# Patient Record
Sex: FEMALE | Race: WHITE | NOT HISPANIC OR LATINO | Employment: FULL TIME | URBAN - NONMETROPOLITAN AREA
[De-identification: names, ages, dates, MRNs, and addresses within clinical notes are randomized per-mention and may not be internally consistent; named-entity substitution may affect disease eponyms.]

---

## 2017-01-24 DIAGNOSIS — N92.4 EXCESSIVE BLEEDING IN PREMENOPAUSAL PERIOD: ICD-10-CM

## 2017-01-24 DIAGNOSIS — E03.9 HYPOTHYROIDISM: ICD-10-CM

## 2017-01-24 DIAGNOSIS — Z12.31 ENCOUNTER FOR SCREENING MAMMOGRAM FOR MALIGNANT NEOPLASM OF BREAST: ICD-10-CM

## 2017-01-24 DIAGNOSIS — L68.0 HIRSUTISM: ICD-10-CM

## 2017-02-01 ENCOUNTER — TRANSCRIBE ORDERS (OUTPATIENT)
Dept: ADMINISTRATIVE | Facility: HOSPITAL | Age: 52
End: 2017-02-01

## 2017-02-01 ENCOUNTER — APPOINTMENT (OUTPATIENT)
Dept: LAB | Facility: HOSPITAL | Age: 52
End: 2017-02-01
Payer: COMMERCIAL

## 2017-02-01 DIAGNOSIS — N92.4 EXCESSIVE BLEEDING IN PREMENOPAUSAL PERIOD: ICD-10-CM

## 2017-02-01 DIAGNOSIS — E03.9 HYPOTHYROIDISM: ICD-10-CM

## 2017-02-01 LAB
ALBUMIN SERPL BCP-MCNC: 3.8 G/DL (ref 3.5–5)
ALP SERPL-CCNC: 70 U/L (ref 46–116)
ALT SERPL W P-5'-P-CCNC: 23 U/L (ref 12–78)
ANION GAP SERPL CALCULATED.3IONS-SCNC: 9 MMOL/L (ref 4–13)
AST SERPL W P-5'-P-CCNC: 12 U/L (ref 5–45)
BILIRUB SERPL-MCNC: 1.1 MG/DL (ref 0.2–1)
BUN SERPL-MCNC: 10 MG/DL (ref 5–25)
CALCIUM SERPL-MCNC: 9.4 MG/DL (ref 8.3–10.1)
CHLORIDE SERPL-SCNC: 105 MMOL/L (ref 100–108)
CHOLEST SERPL-MCNC: 186 MG/DL (ref 50–200)
CO2 SERPL-SCNC: 27 MMOL/L (ref 21–32)
CREAT SERPL-MCNC: 0.77 MG/DL (ref 0.6–1.3)
ERYTHROCYTE [DISTWIDTH] IN BLOOD BY AUTOMATED COUNT: 13.1 % (ref 11.6–15.1)
GFR SERPL CREATININE-BSD FRML MDRD: >60 ML/MIN/1.73SQ M
GLUCOSE SERPL-MCNC: 92 MG/DL (ref 65–140)
HCT VFR BLD AUTO: 42.6 % (ref 34.8–46.1)
HDLC SERPL-MCNC: 48 MG/DL (ref 40–60)
HGB BLD-MCNC: 14 G/DL (ref 11.5–15.4)
LDLC SERPL CALC-MCNC: 102 MG/DL (ref 0–100)
MCH RBC QN AUTO: 29.1 PG (ref 26.8–34.3)
MCHC RBC AUTO-ENTMCNC: 32.9 G/DL (ref 31.4–37.4)
MCV RBC AUTO: 89 FL (ref 82–98)
PLATELET # BLD AUTO: 259 THOUSANDS/UL (ref 149–390)
PMV BLD AUTO: 9.8 FL (ref 8.9–12.7)
POTASSIUM SERPL-SCNC: 4.2 MMOL/L (ref 3.5–5.3)
PROT SERPL-MCNC: 7.3 G/DL (ref 6.4–8.2)
RBC # BLD AUTO: 4.81 MILLION/UL (ref 3.81–5.12)
SODIUM SERPL-SCNC: 141 MMOL/L (ref 136–145)
TRIGL SERPL-MCNC: 178 MG/DL
TSH SERPL DL<=0.05 MIU/L-ACNC: 2.38 UIU/ML (ref 0.36–3.74)
WBC # BLD AUTO: 7.38 THOUSAND/UL (ref 4.31–10.16)

## 2017-02-01 PROCEDURE — 80061 LIPID PANEL: CPT

## 2017-02-01 PROCEDURE — 84443 ASSAY THYROID STIM HORMONE: CPT

## 2017-02-01 PROCEDURE — 36415 COLL VENOUS BLD VENIPUNCTURE: CPT

## 2017-02-01 PROCEDURE — 85027 COMPLETE CBC AUTOMATED: CPT

## 2017-02-01 PROCEDURE — 80053 COMPREHEN METABOLIC PANEL: CPT

## 2017-02-10 ENCOUNTER — APPOINTMENT (OUTPATIENT)
Dept: LAB | Facility: CLINIC | Age: 52
End: 2017-02-10
Payer: COMMERCIAL

## 2017-02-10 ENCOUNTER — ALLSCRIPTS OFFICE VISIT (OUTPATIENT)
Dept: OTHER | Facility: OTHER | Age: 52
End: 2017-02-10

## 2017-02-10 DIAGNOSIS — L68.0 HIRSUTISM: ICD-10-CM

## 2017-02-10 PROCEDURE — 82627 DEHYDROEPIANDROSTERONE: CPT

## 2017-02-10 PROCEDURE — 82626 DEHYDROEPIANDROSTERONE: CPT

## 2017-02-10 PROCEDURE — 36415 COLL VENOUS BLD VENIPUNCTURE: CPT

## 2017-02-11 LAB — DHEA-S SERPL-MCNC: 307.4 UG/DL (ref 41.2–243.7)

## 2017-02-15 LAB — DHEA SERPL-MCNC: 385 NG/DL (ref 31–701)

## 2017-02-27 ENCOUNTER — HOSPITAL ENCOUNTER (OUTPATIENT)
Dept: RADIOLOGY | Facility: HOSPITAL | Age: 52
Discharge: HOME | End: 2017-02-27
Payer: COMMERCIAL

## 2017-02-27 DIAGNOSIS — Z12.31 ENCOUNTER FOR SCREENING MAMMOGRAM FOR MALIGNANT NEOPLASM OF BREAST: ICD-10-CM

## 2017-02-27 PROCEDURE — G0202 SCR MAMMO BI INCL CAD: HCPCS

## 2017-04-03 ENCOUNTER — ALLSCRIPTS OFFICE VISIT (OUTPATIENT)
Dept: OTHER | Facility: OTHER | Age: 52
End: 2017-04-03

## 2017-04-03 DIAGNOSIS — R63.5 ABNORMAL WEIGHT GAIN: ICD-10-CM

## 2017-04-03 DIAGNOSIS — Z86.39 PERSONAL HISTORY OF OTHER ENDOCRINE, NUTRITIONAL AND METABOLIC DISEASE: ICD-10-CM

## 2017-04-03 DIAGNOSIS — R53.83 OTHER FATIGUE: ICD-10-CM

## 2017-04-03 DIAGNOSIS — E66.9 OBESITY: ICD-10-CM

## 2017-04-03 DIAGNOSIS — E28.8 OTHER OVARIAN DYSFUNCTION: ICD-10-CM

## 2017-05-16 ENCOUNTER — TRANSCRIBE ORDERS (OUTPATIENT)
Dept: LAB | Facility: HOSPITAL | Age: 52
End: 2017-05-16

## 2017-05-16 ENCOUNTER — GENERIC CONVERSION - ENCOUNTER (OUTPATIENT)
Dept: OTHER | Facility: OTHER | Age: 52
End: 2017-05-16

## 2017-05-16 ENCOUNTER — APPOINTMENT (OUTPATIENT)
Dept: LAB | Facility: HOSPITAL | Age: 52
End: 2017-05-16
Attending: INTERNAL MEDICINE
Payer: COMMERCIAL

## 2017-05-16 DIAGNOSIS — E66.9 OBESITY: ICD-10-CM

## 2017-05-16 DIAGNOSIS — R63.5 ABNORMAL WEIGHT GAIN: Primary | ICD-10-CM

## 2017-05-16 DIAGNOSIS — R53.83 OTHER FATIGUE: ICD-10-CM

## 2017-05-16 DIAGNOSIS — E28.8 OTHER OVARIAN DYSFUNCTION: ICD-10-CM

## 2017-05-16 DIAGNOSIS — Z86.39 PERSONAL HISTORY OF OTHER ENDOCRINE, NUTRITIONAL AND METABOLIC DISEASE: ICD-10-CM

## 2017-05-16 DIAGNOSIS — R63.5 ABNORMAL WEIGHT GAIN: ICD-10-CM

## 2017-05-16 LAB
25(OH)D3 SERPL-MCNC: 8.8 NG/ML (ref 30–100)
BASOPHILS # BLD AUTO: 0.06 THOUSANDS/ΜL (ref 0–0.1)
BASOPHILS NFR BLD AUTO: 1 % (ref 0–1)
CORTIS SERPL-MCNC: <0.5 UG/ML
EOSINOPHIL # BLD AUTO: 0.01 THOUSAND/ΜL (ref 0–0.61)
EOSINOPHIL NFR BLD AUTO: 0 % (ref 0–6)
ERYTHROCYTE [DISTWIDTH] IN BLOOD BY AUTOMATED COUNT: 13.1 % (ref 11.6–15.1)
HCT VFR BLD AUTO: 43.6 % (ref 34.8–46.1)
HGB BLD-MCNC: 14.1 G/DL (ref 11.5–15.4)
LYMPHOCYTES # BLD AUTO: 2.62 THOUSANDS/ΜL (ref 0.6–4.47)
LYMPHOCYTES NFR BLD AUTO: 29 % (ref 14–44)
MCH RBC QN AUTO: 28.7 PG (ref 26.8–34.3)
MCHC RBC AUTO-ENTMCNC: 32.3 G/DL (ref 31.4–37.4)
MCV RBC AUTO: 89 FL (ref 82–98)
MONOCYTES # BLD AUTO: 0.36 THOUSAND/ΜL (ref 0.17–1.22)
MONOCYTES NFR BLD AUTO: 4 % (ref 4–12)
NEUTROPHILS # BLD AUTO: 5.99 THOUSANDS/ΜL (ref 1.85–7.62)
NEUTS SEG NFR BLD AUTO: 66 % (ref 43–75)
NRBC BLD AUTO-RTO: 0 /100 WBCS
PLATELET # BLD AUTO: 292 THOUSANDS/UL (ref 149–390)
PMV BLD AUTO: 9.8 FL (ref 8.9–12.7)
RBC # BLD AUTO: 4.92 MILLION/UL (ref 3.81–5.12)
T4 FREE SERPL-MCNC: 0.93 NG/DL (ref 0.76–1.46)
WBC # BLD AUTO: 9.07 THOUSAND/UL (ref 4.31–10.16)

## 2017-05-16 PROCEDURE — 84439 ASSAY OF FREE THYROXINE: CPT

## 2017-05-16 PROCEDURE — 36415 COLL VENOUS BLD VENIPUNCTURE: CPT

## 2017-05-16 PROCEDURE — 82533 TOTAL CORTISOL: CPT

## 2017-05-16 PROCEDURE — 85025 COMPLETE CBC W/AUTO DIFF WBC: CPT

## 2017-05-16 PROCEDURE — 84403 ASSAY OF TOTAL TESTOSTERONE: CPT

## 2017-05-16 PROCEDURE — 84402 ASSAY OF FREE TESTOSTERONE: CPT

## 2017-05-16 PROCEDURE — 82306 VITAMIN D 25 HYDROXY: CPT

## 2017-05-16 PROCEDURE — 82627 DEHYDROEPIANDROSTERONE: CPT

## 2017-05-17 ENCOUNTER — GENERIC CONVERSION - ENCOUNTER (OUTPATIENT)
Dept: OTHER | Facility: OTHER | Age: 52
End: 2017-05-17

## 2017-05-17 LAB
DHEA-S SERPL-MCNC: 320.5 UG/DL (ref 41.2–243.7)
TESTOST FREE SERPL-MCNC: 0.9 PG/ML (ref 0–4.2)
TESTOST SERPL-MCNC: 32 NG/DL (ref 3–41)

## 2017-05-21 ENCOUNTER — GENERIC CONVERSION - ENCOUNTER (OUTPATIENT)
Dept: OTHER | Facility: OTHER | Age: 52
End: 2017-05-21

## 2017-06-12 ENCOUNTER — ALLSCRIPTS OFFICE VISIT (OUTPATIENT)
Dept: OTHER | Facility: OTHER | Age: 52
End: 2017-06-12

## 2017-06-26 DIAGNOSIS — L68.0 HIRSUTISM: ICD-10-CM

## 2017-06-26 DIAGNOSIS — R22.1 LOCALIZED SWELLING, MASS OR LUMP OF NECK: ICD-10-CM

## 2017-07-25 ENCOUNTER — HOSPITAL ENCOUNTER (OUTPATIENT)
Dept: ULTRASOUND IMAGING | Facility: HOSPITAL | Age: 52
Discharge: HOME | End: 2017-07-25
Payer: COMMERCIAL

## 2017-07-25 DIAGNOSIS — R22.1 LOCALIZED SWELLING, MASS OR LUMP OF NECK: ICD-10-CM

## 2017-07-25 PROCEDURE — 76536 US EXAM OF HEAD AND NECK: CPT

## 2017-08-03 ENCOUNTER — APPOINTMENT (OUTPATIENT)
Dept: LAB | Facility: HOSPITAL | Age: 52
End: 2017-08-03
Payer: COMMERCIAL

## 2017-08-03 ENCOUNTER — TRANSCRIBE ORDERS (OUTPATIENT)
Dept: ADMINISTRATIVE | Facility: HOSPITAL | Age: 52
End: 2017-08-03

## 2017-08-03 DIAGNOSIS — Z00.8 HEALTH EXAMINATION IN POPULATION SURVEY: Primary | ICD-10-CM

## 2017-08-03 DIAGNOSIS — Z00.8 HEALTH EXAMINATION IN POPULATION SURVEY: ICD-10-CM

## 2017-08-03 DIAGNOSIS — E03.9 HYPOTHYROIDISM: ICD-10-CM

## 2017-08-03 LAB
ALBUMIN SERPL BCP-MCNC: 4.2 G/DL (ref 3.5–5)
ALP SERPL-CCNC: 70 U/L (ref 46–116)
ALT SERPL W P-5'-P-CCNC: 27 U/L (ref 12–78)
ANION GAP SERPL CALCULATED.3IONS-SCNC: 10 MMOL/L (ref 4–13)
AST SERPL W P-5'-P-CCNC: 14 U/L (ref 5–45)
BILIRUB SERPL-MCNC: 1 MG/DL (ref 0.2–1)
BUN SERPL-MCNC: 19 MG/DL (ref 5–25)
CALCIUM SERPL-MCNC: 9.6 MG/DL (ref 8.3–10.1)
CHLORIDE SERPL-SCNC: 102 MMOL/L (ref 100–108)
CHOLEST SERPL-MCNC: 228 MG/DL (ref 50–200)
CO2 SERPL-SCNC: 26 MMOL/L (ref 21–32)
CREAT SERPL-MCNC: 0.79 MG/DL (ref 0.6–1.3)
ERYTHROCYTE [DISTWIDTH] IN BLOOD BY AUTOMATED COUNT: 13.1 % (ref 11.6–15.1)
EST. AVERAGE GLUCOSE BLD GHB EST-MCNC: 123 MG/DL
GFR SERPL CREATININE-BSD FRML MDRD: 86 ML/MIN/1.73SQ M
GLUCOSE P FAST SERPL-MCNC: 106 MG/DL (ref 65–99)
HBA1C MFR BLD: 5.9 % (ref 4.2–6.3)
HCT VFR BLD AUTO: 44.5 % (ref 34.8–46.1)
HDLC SERPL-MCNC: 55 MG/DL (ref 40–60)
HGB BLD-MCNC: 14.4 G/DL (ref 11.5–15.4)
LDLC SERPL CALC-MCNC: 139 MG/DL (ref 0–100)
MCH RBC QN AUTO: 29.3 PG (ref 26.8–34.3)
MCHC RBC AUTO-ENTMCNC: 32.4 G/DL (ref 31.4–37.4)
MCV RBC AUTO: 90 FL (ref 82–98)
PLATELET # BLD AUTO: 286 THOUSANDS/UL (ref 149–390)
PMV BLD AUTO: 9.8 FL (ref 8.9–12.7)
POTASSIUM SERPL-SCNC: 4.5 MMOL/L (ref 3.5–5.3)
PROT SERPL-MCNC: 8.1 G/DL (ref 6.4–8.2)
RBC # BLD AUTO: 4.92 MILLION/UL (ref 3.81–5.12)
SODIUM SERPL-SCNC: 138 MMOL/L (ref 136–145)
TRIGL SERPL-MCNC: 168 MG/DL
TSH SERPL DL<=0.05 MIU/L-ACNC: 3.48 UIU/ML (ref 0.36–3.74)
WBC # BLD AUTO: 7.05 THOUSAND/UL (ref 4.31–10.16)

## 2017-08-03 PROCEDURE — 85027 COMPLETE CBC AUTOMATED: CPT

## 2017-08-03 PROCEDURE — 80061 LIPID PANEL: CPT

## 2017-08-03 PROCEDURE — 83036 HEMOGLOBIN GLYCOSYLATED A1C: CPT

## 2017-08-03 PROCEDURE — 36415 COLL VENOUS BLD VENIPUNCTURE: CPT

## 2017-08-03 PROCEDURE — 80053 COMPREHEN METABOLIC PANEL: CPT

## 2017-08-03 PROCEDURE — 84443 ASSAY THYROID STIM HORMONE: CPT

## 2017-08-10 DIAGNOSIS — E55.9 VITAMIN D DEFICIENCY: ICD-10-CM

## 2017-08-10 DIAGNOSIS — E03.9 HYPOTHYROIDISM: ICD-10-CM

## 2017-08-18 ENCOUNTER — OFFICE VISIT (OUTPATIENT)
Dept: URGENT CARE | Facility: CLINIC | Age: 52
End: 2017-08-18
Payer: COMMERCIAL

## 2017-08-18 PROCEDURE — 99213 OFFICE O/P EST LOW 20 MIN: CPT

## 2017-08-18 PROCEDURE — S9088 SERVICES PROVIDED IN URGENT: HCPCS

## 2017-08-23 ENCOUNTER — ALLSCRIPTS OFFICE VISIT (OUTPATIENT)
Dept: OTHER | Facility: OTHER | Age: 52
End: 2017-08-23

## 2017-11-01 DIAGNOSIS — E28.8 OTHER OVARIAN DYSFUNCTION: ICD-10-CM

## 2017-11-01 DIAGNOSIS — R47.89 OTHER SPEECH DISTURBANCES (CODE): ICD-10-CM

## 2017-11-01 DIAGNOSIS — R41.3 OTHER AMNESIA: ICD-10-CM

## 2017-11-22 ENCOUNTER — ALLSCRIPTS OFFICE VISIT (OUTPATIENT)
Dept: OTHER | Facility: OTHER | Age: 52
End: 2017-11-22

## 2017-11-23 NOTE — CONSULTS
Assessment    1  Memory difficulties (780 93) (R41 3)   2  Word finding difficulty (V40 1) (R47 89)   3  Claustrophobia (300 29) (F40 240)    Plan  Claustrophobia    · LORazepam 1 MG Oral Tablet (Ativan); 2 tablets one hour before MRI-may repeatone pill after one-hour when necessary   Rx By: Nuvia Gallardo; Dispense: 0 Days ; #:3 Tablet; Refill: 1;For: Claustrophobia; ABILIO = N; Print Rx  Memory difficulties    · EEG AWAKE AND ASLEEP; Status:Hold For - Scheduling; Requested for:22Nov2017;    Perform:[facility]; [phone]; Ordered;difficulties; Ordered By:Joaquin Crow;  Memory difficulties, Word finding difficulty    · (1) NISA SCREEN W/REFLEX TO TITER/PATTERN; Status:Active; Requestedfor:22Nov2017;    Perform:[facility]; VFA:[id number]; Ordered; For:Memory difficulties, Word finding difficulty; Ordered By:Fletcher Crow;   · (1) FOLATE; Status:Active; Requested for:22Nov2017;    Perform:[facility]; VEF:[id number]; Ordered;difficulties, Word finding difficulty; Ordered By:Joaquin Crow;   · (1) LYME ANTIBODY PROFILE W/REFLEX TO WESTERN BLOT; Status:Active; Requestedfor:22Nov2017;    Perform:[facility]; HCP:[id number]; Ordered; For:Memory difficulties, Word finding difficulty; Ordered By:Joaquin Crow;   · (1) SED RATE; Status:Active; Requested for:22Nov2017;    Perform:[facility]; AAU:[id number]; Ordered; For:Memory difficulties, Word finding difficulty; Ordered By:Joaquin Crow;   · (1) VITAMIN B12; Status:Active; Requested for:22Nov2017;    Perform:[facility]; AIS:[id number]; Ordered;difficulties, Word finding difficulty; Ordered By:Fletcher Crow;   · * MRI BRAIN WO CONTRAST; Status:Need Information - Financial Authorization; Requested for:22Nov2017;    Perform:[facility]; RJH:[id number]; Ordered;difficulties, Word finding difficulty;  Ordered By:Joaquin Crow;   · Follow-up visit in 2 months Evaluation and Treatment  Follow-up  Status: Hold For -Scheduling  Requested for: [id number]   Ordered;Memory difficulties, Word finding difficulty; Ordered By: Beulah Easley Performed:  Due: 00EKC5190    Discussion/Summary  Discussion Summary:   Yonny La did well on cognitive testing, however during conversation did occasionally have to pause to think of the correct word  Rule out left hemispheric structural lesion affecting language function, especially given the progressive nature  Rule out other secondary etiologies  Have recommended MRI head, EEG and blood work  I will see her back in follow-up on these are completed  Chief Complaint  Chief Complaint Free Text Note Form: Patient is a 47year old right handed lady referred by Dr Lamberto Magana for memory difficulty  History of Present Illness  HPI: Yonny La presents today with the above complaints  She reports that for the last 6 months she has been noticing problems with her memory and she feels that it has gotten worse the last 3 months  The main issue that she is having she describes as word-finding difficulty  She states that if somebody asked her the name of an object such as a pen if he catches are off guard it may take her a while to think of the correct name  She states that sometimes in a conversation she finds that other people were finishing what she wants to say because it takes her time to get the words out  She also finds that she sometimes misplaces things and has a hard time finding them  She reports on 1 or 2 occasions she will drive somewhere that she goes all the time and does not remember the details of getting there  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  She handles the finances at home and is handling these without air  She states her  has noticed some of the problems that she is having but other people have not  She denies any vision disturbance or localizing numbness or weakness   She reports that her grandmother had Alzheimer's disease and her mother had dementia after a long course of MS      Review of Systems  Neurological ROS:  Constitutional: recent weight gain-- and-- fatigue  HEENT:  no sinus problems, not feeling congested, no blurred vision, no dryness of the eyes, no eye pain, no hearing loss, no tinnitus, no mouth sores, no sore throat, no hoarseness, no dysphagia, no masses, no bleeding  Cardiovascular:  no chest pain or pressure, no palpitations present, the heart rate was not rapid or irregular, no swelling in the arms or legs, no poor circulation  Respiratory:  no unusual or persistant cough, no shortness of breath with or without exertion  Gastrointestinal: nausea  Genitourinary:  no incontinence, no feelings of urinary urgency, no increase in frequency, no urinary hesitancy, no dysuria, no hematuria  Musculoskeletal: arthralgias-- and-- myalgias  Integumentary  no masses, no rash, no skin lesions, no livedo reticularis  Psychiatric: depression  Endocrine hair loss or gain-- and-- loss of sexual ability or drive   Hematologic/Lymphatic:  no unusual bleeding, no tendency for easy bruising, no clotting skin or lumps  Neurological General: headache-- and-- waking up at night  Neurological Mental Status: memory problems  Neurological Cranial Nerves:  no blurry or double vision, no loss of vision, no face drooping, no facial numbness or weakness, no taste or smell loss/changes, no hearing loss or ringing, no vertigo or dizziness, no dysphagia, no slurred speech  Neurological Motor findings include:  no tremor, no twitching, no cramping(pre/post exercise), no atrophy  Neurological Coordination:  no unsteadiness, no vertigo or dizziness, no clumsiness, no problems reaching for objects  Neurological Sensory:  no numbness, no pain, no tingling, does not fall when eyes closed or taking a shower  Neurological Gait:  no difficulty walking, not falling to one side, no sensation of being pushed, has not had falls     ROS Reviewed: ROS reviewed  Active Problems  1  _ (281)   2  Abnormal weight gain (783 1) (R63 5)   3  Depression (311) (F32 9)   4  Encounter for screening mammogram for breast cancer (V76 12) (Z12 31)   5  Fatigue (780 79) (R53 83)   6  Hirsutism (704 1) (L68 0)   7  Hyperandrogenism (256 1) (E28 8)   8  Hyperglycemia (790 29) (R73 9)   9  Hyperlipidemia (272 4) (E78 5)   10  Lump in neck (784 2) (R22 1)   11  Memory difficulties (780 93) (R41 3)   12  Migraine headache (346 90) (G43 909)   13  Obesity (278 00) (E66 9)   14  Vitamin D deficiency (268 9) (E55 9)    Past Medical History  1  History of Acute diarrhea (787 91) (R19 7)   2  History of Acute upper respiratory infection (465 9) (J06 9)   3  History of Acute viral syndrome (079 99) (B34 9)   4  History of Body aches (780 96) (R52)   5  History of Excessive cerumen in left ear canal (380 4) (H61 22)   6  History of Headache, acute (784 0) (R51)   7  History of diarrhea (V12 79) (Z87 898)   8  History of dizziness (V13 89) (Z87 898)   9  History of headache (V13 89) (Z87 898)   10  History of hypothyroidism (V12 29) (Z86 39)   11  History of nausea (V12 79) (Z87 898)   12  History of sinusitis (V12 69) (Z87 09)   13  History of viral infection (V12 09) (Z86 19)   14  History of weakness (V13 89) (Z87 898)   15  History of Late Effects Of Sprain Or Strain (905 7)   16  History of Premenopausal menorrhagia (627 0) (N92 4)   17  History of Screening for malignant neoplasm of breast (V76 10) (Z12 31)  Active Problems And Past Medical History Reviewed: The active problems and past medical history were reviewed and updated today  Surgical History  1  History of Cholecystectomy   2  History of Hand Surgery  Surgical History Reviewed: The surgical history was reviewed and updated today  Family History  Mother    1  Family history of COPD, severe   2  Family history of Alzheimer's disease (V17 2) (Z82 0)   3   Family history of Multiple Sclerosis  Father 4  Family history of diabetes mellitus (V18 0) (Z83 3)  Paternal Grandmother    5  Family history of diabetes mellitus (V18 0) (Z83 3)   6  Family history of thyroid disease (V18 19) (Z83 49)  Maternal Grandfather    7  Family history of kidney disease (V18 69) (Z84 1)  Paternal Grandfather    6  Family history of diabetes mellitus (V18 0) (Z83 3)   9  Family history of Heart problem  Family History    10  Family history of Diabetes Mellitus (V18 0)   11  Family history of Heart Disease (V17 49)   12  Family history of Non-Hodgkin's Lymphoma   13  Family history of Renal Disorder  Family History Reviewed: The family history was reviewed and updated today  Social History     · Alcohol Use (History)   · Daily caffeine consumption   · Employed   · Former smoker (P28 36) (Z53 944)   ·    · Never Used Drugs   · Non smoker / tobacco user (V49 89) (Z78 9)  Social History Reviewed: The social history was reviewed and updated today  Current Meds   1  Spironolactone 50 MG Oral Tablet; 1 Tab daily; Therapy: 76ORG2889 to (Evaluate:49Tey6251); Last Rx:12Jun2017 Ordered   2  Vitamin D3 5000 UNIT Oral Capsule; take 1 capsule daily; Therapy: (Recorded:22Nov2017) to Recorded  Medication List Reviewed: The medication list was reviewed and updated today  Allergies    1  No Known Drug Allergies    Vitals  Signs   Recorded: 22Nov2017 12:48PM   Heart Rate: 78  Systolic: 272, LUE, Sitting  Diastolic: 74, LUE, Sitting  Height: 5 ft 3 5 in  Weight: 217 lb   BMI Calculated: 37 84  BSA Calculated: 2 01  Pain Scale: 0    Physical Exam   GENERAL: Cooperative in no acute distress  Well-developed and well-nourished  HEAD and NECK  Head is atraumatic normocephalic with no lesions or masses  Neck is supple with full range of motion  CARDIOVASCULAR Carotid Arteries-no carotid bruits  NEUROLOGIC: Mental Status-the patient is awake alert and oriented without aphasia or apraxia    She scored a 30/30 on MOCA Cranial Nerves: Visual fields are full to confrontation  Discs are flat  Extraocular movements are full without nystagmus  Pupils are 2-1/2 mm and reactive  Face is symmetrical to light touch  Movements of facial expression move symmetrically  Hearing is normal to finger rub bilaterally  Soft palate lifts symmetrically  Shoulder shrug is symmetrical  Tongue is midline without atrophy  Motor: No drift is noted on arm extension  Strength is full in the upper and lower extremities with normal bulk and tone  Sensory: Intact to temperature and vibratory sensation in the upper and lower extremities bilaterally  Cortical function is intact  Coordination: Finger to nose testing is performed accurately  Romberg is negative  Gait reveals a normal base with symmetrical arm swing  Tandem walk is normal  Reflexes:  2/4 and symmetrical in the biceps, triceps, brachioradialis, knee jerk and ankle jerk regions Toes are downgoing      Future Appointments    Date/Time Provider Specialty Site   12/19/2017 08:00 AM COLBY Willis  Endocrinology [facility] ENDOCRINOLOGY   12/19/2017 01:00 PM COLBY Weathers   Obstetrics/Gynecology [facility] SURGICAL [facility]       Signatures   Electronically signed by : Yasmani Walter MD; Nov 22 2017  1:25PM EST                       (Author)

## 2017-12-06 ENCOUNTER — TRANSCRIBE ORDERS (OUTPATIENT)
Dept: ADMINISTRATIVE | Facility: HOSPITAL | Age: 52
End: 2017-12-06

## 2017-12-06 DIAGNOSIS — R41.3 MEMORY LOSS: Primary | ICD-10-CM

## 2017-12-15 ENCOUNTER — APPOINTMENT (OUTPATIENT)
Dept: LAB | Facility: HOSPITAL | Age: 52
End: 2017-12-15
Payer: COMMERCIAL

## 2017-12-15 ENCOUNTER — TRANSCRIBE ORDERS (OUTPATIENT)
Dept: ADMINISTRATIVE | Facility: HOSPITAL | Age: 52
End: 2017-12-15

## 2017-12-15 ENCOUNTER — GENERIC CONVERSION - ENCOUNTER (OUTPATIENT)
Dept: OTHER | Facility: OTHER | Age: 52
End: 2017-12-15

## 2017-12-15 DIAGNOSIS — R47.89 OTHER SPEECH DISTURBANCES (CODE): ICD-10-CM

## 2017-12-15 DIAGNOSIS — R41.3 OTHER AMNESIA: ICD-10-CM

## 2017-12-15 DIAGNOSIS — E28.8 OTHER OVARIAN DYSFUNCTION: ICD-10-CM

## 2017-12-15 DIAGNOSIS — R41.3 MEMORY LOSS: Primary | ICD-10-CM

## 2017-12-15 LAB
25(OH)D3 SERPL-MCNC: 35.2 NG/ML (ref 30–100)
ANION GAP SERPL CALCULATED.3IONS-SCNC: 12 MMOL/L (ref 4–13)
BUN SERPL-MCNC: 13 MG/DL (ref 5–25)
CALCIUM SERPL-MCNC: 9.7 MG/DL (ref 8.3–10.1)
CHLORIDE SERPL-SCNC: 106 MMOL/L (ref 100–108)
CO2 SERPL-SCNC: 27 MMOL/L (ref 21–32)
CREAT SERPL-MCNC: 0.95 MG/DL (ref 0.6–1.3)
ERYTHROCYTE [SEDIMENTATION RATE] IN BLOOD: 6 MM/HOUR (ref 0–20)
FOLATE SERPL-MCNC: 16.8 NG/ML (ref 3.1–17.5)
GFR SERPL CREATININE-BSD FRML MDRD: 69 ML/MIN/1.73SQ M
GLUCOSE P FAST SERPL-MCNC: 103 MG/DL (ref 65–99)
POTASSIUM SERPL-SCNC: 4.4 MMOL/L (ref 3.5–5.3)
SODIUM SERPL-SCNC: 145 MMOL/L (ref 136–145)
VIT B12 SERPL-MCNC: 353 PG/ML (ref 100–900)

## 2017-12-15 PROCEDURE — 36415 COLL VENOUS BLD VENIPUNCTURE: CPT

## 2017-12-15 PROCEDURE — 82746 ASSAY OF FOLIC ACID SERUM: CPT

## 2017-12-15 PROCEDURE — 82306 VITAMIN D 25 HYDROXY: CPT

## 2017-12-15 PROCEDURE — 82607 VITAMIN B-12: CPT

## 2017-12-15 PROCEDURE — 86618 LYME DISEASE ANTIBODY: CPT

## 2017-12-15 PROCEDURE — 86038 ANTINUCLEAR ANTIBODIES: CPT

## 2017-12-15 PROCEDURE — 80048 BASIC METABOLIC PNL TOTAL CA: CPT

## 2017-12-15 PROCEDURE — 85652 RBC SED RATE AUTOMATED: CPT

## 2017-12-15 PROCEDURE — 82627 DEHYDROEPIANDROSTERONE: CPT

## 2017-12-16 LAB — DHEA-S SERPL-MCNC: 374.2 UG/DL (ref 41.2–243.7)

## 2017-12-18 ENCOUNTER — GENERIC CONVERSION - ENCOUNTER (OUTPATIENT)
Dept: OTHER | Facility: OTHER | Age: 52
End: 2017-12-18

## 2017-12-18 ENCOUNTER — HOSPITAL ENCOUNTER (OUTPATIENT)
Dept: MRI IMAGING | Facility: HOSPITAL | Age: 52
Discharge: HOME | End: 2017-12-21
Attending: PSYCHIATRY & NEUROLOGY
Payer: COMMERCIAL

## 2017-12-18 DIAGNOSIS — R41.3 MEMORY LOSS: ICD-10-CM

## 2017-12-18 LAB
B BURGDOR IGG SER IA-ACNC: 0.17
B BURGDOR IGM SER IA-ACNC: 0.31
RYE IGE QN: NEGATIVE

## 2017-12-18 PROCEDURE — 70551 MRI BRAIN STEM W/O DYE: CPT

## 2017-12-19 ENCOUNTER — LAB REQUISITION (OUTPATIENT)
Dept: LAB | Facility: HOSPITAL | Age: 52
End: 2017-12-19
Payer: COMMERCIAL

## 2017-12-19 ENCOUNTER — ALLSCRIPTS OFFICE VISIT (OUTPATIENT)
Dept: OTHER | Facility: OTHER | Age: 52
End: 2017-12-19

## 2017-12-19 DIAGNOSIS — Z12.31 ENCOUNTER FOR SCREENING MAMMOGRAM FOR MALIGNANT NEOPLASM OF BREAST: ICD-10-CM

## 2017-12-19 DIAGNOSIS — Z01.419 ENCOUNTER FOR GYNECOLOGICAL EXAMINATION WITHOUT ABNORMAL FINDING: ICD-10-CM

## 2017-12-19 DIAGNOSIS — Z11.51 ENCOUNTER FOR SCREENING FOR HUMAN PAPILLOMAVIRUS (HPV): ICD-10-CM

## 2017-12-19 PROCEDURE — 87624 HPV HI-RISK TYP POOLED RSLT: CPT | Performed by: OBSTETRICS & GYNECOLOGY

## 2017-12-19 PROCEDURE — G0145 SCR C/V CYTO,THINLAYER,RESCR: HCPCS | Performed by: OBSTETRICS & GYNECOLOGY

## 2017-12-20 NOTE — PROGRESS NOTES
Assessment  1  Encounter for gynecological examination without abnormal finding (V72 31) (Z01 419)    Plan  Encounter for screening mammogram for malignant neoplasm of breast    · * MAMMO SCREENING BILATERAL W CAD; Status:Active - Retrospective By ProtocolAuthorization; Requested for:[id number];    Perform:[facility]; Due:33Xca7344; Last Updated By:Pipe Holt; 12/19/2017 1:18:00 PM;Ordered;For:Encounter for screening mammogram for malignant neoplasm of breast; Ordered By:Saira Valenzuela; Active Problems  1  Abnormal weight gain (783 1) (R63 5)   2  Arthritis (716 90) (M19 90)   3  Claustrophobia (300 29) (F40 240)   4  Depression (311) (F32 9)   5  Encounter for screening mammogram for breast cancer (V76 12) (Z12 31)   6  Encounter for screening mammogram for malignant neoplasm of breast (V76 12) (Z12 31)   7  Fatigue (780 79) (R53 83)   8  GERD (gastroesophageal reflux disease) (530 81) (K21 9)   9  Hirsutism (704 1) (L68 0)   10  Hyperandrogenism (256 1) (E28 8)   11  Hyperglycemia (790 29) (R73 9)   12  Hyperlipidemia (272 4) (E78 5)   13  Lump in neck (784 2) (R22 1)   14  Memory difficulties (780 93) (R41 3)   15  Migraine headache (346 90) (G43 909)   16  Obesity (278 00) (E66 9)   17  Prediabetes (790 29) (R73 03)   18  Vitamin D deficiency (268 9) (E55 9)   19   Word finding difficulty (V40 1) (R47 89)    Past Medical History   · History of Acute diarrhea (787 91) (R19 7)   · History of Acute upper respiratory infection (465 9) (J06 9)   · History of Acute viral syndrome (079 99) (B34 9)   · History of Body aches (780 96) (R52)   · History of Excessive cerumen in left ear canal (380 4) (H61 22)   · History of Headache, acute (784 0) (R51)   · History of diarrhea (V12 79) (W97 337)   · History of dizziness (V13 89) (T11 021)   · History of headache (V13 89) (Z03 175)   · History of hypothyroidism (V12 29) (Z86 39)   · History of nausea (V12 79) (T95 125)   · History of sinusitis (V12 69) (Z87 09)   · History of viral infection (V12 09) (Z86 19)   · History of weakness (V13 89) (C59 875)   · History of Late Effects Of Sprain Or Strain (905 7)   · History of Premenopausal menorrhagia (627 0) (N92 4)   · History of Screening for malignant neoplasm of breast (V76 10) (Z12 31)    Surgical History   · History of Cholecystectomy   · History of Hand Surgery    Family History  Mother    · Family history of COPD, severe   · Family history of Alzheimer's disease (V17 2) (Z82 0)  Father    · Family history of diabetes mellitus (V18 0) (Z83 3)  Sister    · Family history of diabetes mellitus (V18 0) (Z83 3)   · Family history of thyroid disease (V18 19) (Z80 46)  Paternal Grandmother    · Family history of diabetes mellitus (V18 0) (Z83 3)   · Family history of thyroid disease (V18 19) (Z83 49)  Maternal Grandfather    · Family history of kidney disease (V22 75) (Z80 3)  Paternal Grandfather    · Family history of diabetes mellitus (V18 0) (Z83 3)  Family History    · Family history of Non-Hodgkin's Lymphoma   · Family history of Renal Disorder    Social History   · Alcohol Use (History)   · Occasional wine a couple days a week   · Daily caffeine consumption   · Employed   · [employer]   · Former smoker (R50 50) (F10 496)   · 1/2 PACK DAILY   ·    · Never Used Drugs   · Non smoker / tobacco user (V49 89) (Z78 9)    Current Meds   1  Spironolactone 50 MG Oral Tablet; 1 Tab twice  daily; Therapy: 56VZR6738 to (Evaluate:17Jun2018); Last Rx:09Ktt8090 Ordered   2  Vitamin D3 5000 UNIT Oral Capsule; take 1 capsule daily; Therapy: (Recorded:22Nov2017) to Recorded    Allergies  1   No Known Drug Allergies    Vitals   Recorded: 26RWT5874 23:44KY   Systolic 176   Diastolic 62   Height 5 ft 4 in   Weight 215 lb    BMI Calculated 36 9   BSA Calculated 2 02   LMP [id number]     Future Appointments    Date/Time Provider Specialty Site   02/02/2018 03:40 PM Beulah Easley MD Neurology [facility]   06/18/2018 08:30 AM Talyor Moura [facility] Endocrinology [facility] ENDOCRINOLOGY     Signatures   Electronically signed by : COLBY Cruz ; Dec 19 2017  4:22PM EST

## 2017-12-20 NOTE — PROGRESS NOTES
Assessment  1  Encounter for gynecological examination without abnormal finding (V72 31) (Z01 419)    Plan  Encounter for screening mammogram for malignant neoplasm of breast    · * MAMMO SCREENING BILATERAL W CAD; Status:Hold For - Scheduling,RetrospectiveBy Protocol Authorization; Requested for:[id number];    Perform:[facility]; Due:93Rox7514; Last Updated By:Vanessa Shields; 12/19/2017 12:59:53 PM;Ordered;For:Encounter for screening mammogram for malignant neoplasm of breast; Ordered By:Saira Valenzuela;    Discussion/Summary  healthy adult female the risks and benefits of cervical cancer screening were discussed HPV and Pap Co-testing Done Today Breast cancer screening: the risks and benefits of breast cancer screening were discussed and mammogram has been ordered  Colorectal cancer screening: the risks and benefits of colorectal cancer screening were discussed and colorectal cancer screening is current  The patient has the current Goals: Maintain a healthy lifestyle  The patent has the current Barriers: None  Patient is able to Self-Care  Chief Complaint  Patient presents today as a new patient for a yearly GYN Exam       History of Present Illness  HPI: patient is here for her gyn exam she is a new patient her menses are 8 times in 1 year in the past had endometrial ablation for menorrhagia patient denies any gyn problem   GYN HM, Adult Female [facility]: The patient is being seen for a gynecology evaluation  Social History: Household members include spouse  She is   The patient is a former cigarette smoker and quit smoking 5 yrs ago  She reports occasional alcohol use  She has never used illicit drugs  General Health: The patient's health since the last visit is described as good  Lifestyle:  She has weight concerns  -- She exercises regularly  She exercises 3 or more times per week   Exercise includes walking -- She does not use tobacco  The patient is a former cigarette smoker-- and-- quit smoking cigarettes: 5 yrs ago  -- She consumes alcohol  She reports occasional alcohol use  -- She denies drug use  Reproductive health: the patient is perimenopausal--   she reports menstrual problems  Menstrual history: The cycles are irregular-- and-- have been less frequent  Menstrual Problems: dysmenorrhea  -- she uses no contraception  -- she is sexually active  -- pregnancy history: G 3P 2,-- 1(miscarriages: 1 )  Screening:      Review of Systems  periods are irregular, but-- no pelvic pain,-- no menorrhagia,-- no dysuria-- and-- no urinary loss of control  Constitutional: No fever, no chills, feels well, no tiredness, no recent weight gain or loss  Cardiovascular: no complaints of slow or fast heart rate, no chest pain, no palpitations, no leg claudication or lower extremity edema  Breasts: no complaints of breast pain, breast lump or nipple discharge  Gastrointestinal: no complaints of abdominal pain, no constipation, no nausea or diarrhea, no vomiting, no bloody stools  Genitourinary: as noted in HPI  OB History  Pregnancy History (Brief):  Prior pregnancies: : 3  Para: 3 (full-term)-- and-- 2 (living)  Delivery type: 2 vaginal  Additional pregnancy history details: 1 miscarriage(s)   x1 Son  due to Brain Cancer      Active Problems  1  Abnormal weight gain (783 1) (R63 5)   2  Arthritis (716 90) (M19 90)   3  Claustrophobia (300 29) (F40 240)   4  Depression (311) (F32 9)   5  Encounter for screening mammogram for breast cancer (V76 12) (Z12 31)   6  Encounter for screening mammogram for malignant neoplasm of breast (V76 12) (Z12 31)   7  Fatigue (780 79) (R53 83)   8  GERD (gastroesophageal reflux disease) (530 81) (K21 9)   9  Hirsutism (704 1) (L68 0)   10  Hyperandrogenism (256 1) (E28 8)   11  Hyperglycemia (790 29) (R73 9)   12  Hyperlipidemia (272 4) (E78 5)   13  Lump in neck (784 2) (R22 1)   14  Memory difficulties (780 93) (R41 3)   15   Migraine headache (346 90) (G43 909)   16  Obesity (278 00) (E66 9)   17  Prediabetes (790 29) (R73 03)   18  Vitamin D deficiency (268 9) (E55 9)   19   Word finding difficulty (V40 1) (R47 89)    Past Medical History   · History of Acute diarrhea (787 91) (R19 7)   · History of Acute upper respiratory infection (465 9) (J06 9)   · History of Acute viral syndrome (079 99) (B34 9)   · History of Body aches (780 96) (R52)   · History of Excessive cerumen in left ear canal (380 4) (H61 22)   · History of Headache, acute (784 0) (R51)   · History of diarrhea (V12 79) (Q24 600)   · History of dizziness (V13 89) (C90 810)   · History of headache (V13 89) (D58 428)   · History of hypothyroidism (V12 29) (Z86 39)   · History of nausea (V12 79) (O95 419)   · History of sinusitis (V12 69) (Z87 09)   · History of viral infection (V12 09) (Z86 19)   · History of weakness (V13 89) (Z87 898)   · History of Late Effects Of Sprain Or Strain (905 7)   · History of Premenopausal menorrhagia (627 0) (N92 4)   · History of Screening for malignant neoplasm of breast (V76 10) (Z12 31)    Surgical History   · History of Cholecystectomy   · History of Hand Surgery    Family History  Mother    · Family history of COPD, severe   · Family history of Alzheimer's disease (V17 2) (Z82 0)  Father    · Family history of diabetes mellitus (V18 0) (Z83 3)  Sister    · Family history of diabetes mellitus (V18 0) (Z83 3)   · Family history of thyroid disease (V18 19) (Z80 46)  Paternal Grandmother    · Family history of diabetes mellitus (V18 0) (Z83 3)   · Family history of thyroid disease (V18 19) (Z83 49)  Maternal Grandfather    · Family history of kidney disease (V22 75) (Z80 3)  Paternal Grandfather    · Family history of diabetes mellitus (V18 0) (Z83 3)  Family History    · Family history of Non-Hodgkin's Lymphoma   · Family history of Renal Disorder    Social History   · Alcohol Use (History)   · Occasional wine a couple days a week   · Daily caffeine consumption   · 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   · Former smoker (L86 75) (M15 429)   · 1/2 PACK DAILY   ·    · Never Used Drugs   · Non smoker / tobacco user (V49 89) (Z78 9)    Current Meds   1  Spironolactone 50 MG Oral Tablet; 1 Tab twice  daily; Therapy: 98SMX5385 to (Evaluate:17Jun2018); Last Rx:25Rxb7844 Ordered   2  Vitamin D3 5000 UNIT Oral Capsule; take 1 capsule daily; Therapy: (Recorded:22Nov2017) to Recorded    Allergies  1  No Known Drug Allergies    Vitals   Recorded: 73KPR7136 39:89PG   Systolic 628   Diastolic 62   Height 5 ft 4 in   Weight 215 lb    BMI Calculated 36 9   BSA Calculated 2 02   LMP [id number]     Physical Exam   Constitutional  General appearance: No acute distress, well appearing and well nourished  Pulmonary  Auscultation of lungs: Clear to auscultation  Cardiovascular  Auscultation of heart: Normal rate and rhythm, normal S1 and S2, no murmurs  Genitourinary  External genitalia: Normal and no lesions appreciated  Vagina: Normal, no lesions or dryness appreciated  Urethra: Normal    Urethral meatus: Normal    Bladder: Normal, soft, non-tender and no prolapse or masses appreciated  Cervix: Normal, no palpable masses  Examination of the cervix revealed normal findings  A Pap smear was performed  Uterus: Normal, non-tender, not enlarged, and no palpable masses  Adnexa/parametria: Normal, non-tender and no fullness or masses appreciated  Anus, perineum, and rectum: Normal sphincter tone, no masses, and no prolapse  Chest  Breasts: Normal and no dimpling or skin changes noted  normal appearance  Examination of the nipples revealed normal appearance  Right Breast:  No masses palpated  Left Breast: No masses palpated  Abdomen  Abdomen: Normal, non-tender, and no organomegaly noted         Future Appointments    Date/Time Provider Specialty Site   02/02/2018 03:40 PM Kvng Klein MD Neurology [facility]    06/18/2018 08:30 AM Shay Kelley, [facility] Endocrinology [facility] ENDOCRINOLOGY     Signatures   Electronically signed by : COLBY Ford ; Dec 19 2017  1:40PM EST                       (Author)

## 2017-12-20 NOTE — PROGRESS NOTES
Assessment  1  Prediabetes (790 29) (R73 03)   2  Vitamin D deficiency (268 9) (E55 9)   3  Obesity (278 00) (E66 9)   4  Hirsutism (704 1) (L68 0)    Plan  Hirsutism, Hyperandrogenism    · From  Spironolactone 50 MG Oral Tablet 1 Tab daily To Spironolactone 50 MGOral Tablet 1 Tab twice  daily   Rx By: Pricilla Sandifer; Dispense: 90 Days ; #:180 Tablet; Refill: 1;For: Hirsutism, Hyperandrogenism; ABILIO = N; Print Rx  Prediabetes    · (1) BASIC METABOLIC PROFILE; Status:Active; Requested UBY:[id number]; Perform:[facility]; DCS:[id number];ZEAKS; For:Prediabetes; Ordered By:Tomy Davies;   · (1) HEMOGLOBIN A1C; Status:Active; Requested PSL:[id number]; Perform:[facility]; HWN:85KMQ5558;HOMERZIMD; For:Prediabetes; Ordered By:Tomy Davies;   · Follow-up visit in 6 months Evaluation and Treatment  Follow-up  Status: Complete Done: 09NXH2106   Ordered; For: Prediabetes; Ordered By: Pricilla Sandifer Performed:  Due: 43ZGH5905; Last Updated By: Media Ode; 12/19/2017 8:23:30 AM    Discussion/Summary   1  Obesity/ pre diabetes-focus on dietary and lifestyle modifications and weight loss, discussed options of starting saxenda-   Patient has no history of pancreatitis, no personal or family history of medullary thyroid cancer  She is going to think about that and let us know if she is interested in starting  2  Vitamin-D deficiency - replete, continue supplementations 3  Hirsutism - increase spironolactone to 50 mg twice daily, will check electrolytes after she has been on the higher dose for about a month Counseling Documentation With Imm: The patient was counseled regarding diagnostic results,-- instructions for management,-- risk factor reductions,-- impressions,-- risks and benefits of treatment options,-- importance of compliance with treatment  Patient's Capacity to Self-Care: Patient is able to Self-Care     Medication SE Review and Pt Understands Tx: Possible side effects of new medications were reviewed with the patient/guardian today  The treatment plan was reviewed with the patient/guardian  The patient/guardian understands and agrees with the treatment plan      Chief Complaint  Chief Complaint Free Text Note Form: Follow up  History of Present Illness  HPI: 46 y/o woman here To follow-up on hirsutism, vitamin-D deficiency and obesity has had is some has mildly improved since starting spironolactone, trying to lose weight and has lost about 3 lb since her last was atc/o fatigue ,trying to eat better , not very physically active    history of thyroid dysfunction - was on Synthroid 20 years ago for 3 months - recent TSH was normal for vitamin-D deficiency she is on supplementations      Review of Systems  ROS Reviewed:   ROS reviewed  Endo Adult ROS Female Established v2 Update - [facility]:  Constitutional/General: recent weight gain,-- no recent weight loss,-- poor energy/fatigue,-- no increased energy level,-- insomnia/sleep problems,-- no fever-- and-- feeling weak  Breasts: no nipple discharge  Heart: no high blood pressure,-- no chest pain/tightness,-- no rapid/racing heart rate-- and-- no palpitations  Genitourinary - Urinary: no frequent urination,-- no excess urination-- and-- no urinating during the night  Eyes: no blurred vision,-- no double vision,-- no bulging eyes,-- no gritty/scratchy eyes-- and-- no excessive tearing  Mouth / Throat: no hoarseness-- and-- no difficulty swallowing  Neck: lumps,-- no swollen glands,-- no neck pain,-- no neck stiffness-- and-- no enlarged thyroid  Respiratory: no wheezing,-- no asthma-- and-- no persistent cough  Musculoskeletal: muscle aches/pain,-- no joint aches/pain-- and-- muscle weakness  Skin & Hair: no dry skin,-- no acne,-- the hair texture was oily,-- hair loss-- and-- excessive hair growth  Gastrointestinal: no constipation,-- no diarrhea,-- no waking at night to drink-- and-- no stomach ache    Neurological: no blackouts,-- no weakness-- and-- no tremors  Reproductive:  frequency of period is not applicable  -- duration of period is not applicable  -- Date of last menstruation is not applicable  -- regular periods are not applicable  -- discomfort with periods is not applicable  -- excessive bleeding during period is not applicable  -- mood swings are not applicable  Endocrine: no feeling hot frequently,-- no feeling cold frequently,-- no shifts between feeling hot and cold,-- no cold hands or feet,-- no excessive sweating,-- no thyroid problems,-- no blood sugar problems,-- no excessive thirst,-- no excessive hunger,-- no change in shoe size,-- no nausea or vomiting-- and-- no shaky hands  Active Problems  1  _ (281)   2  Abnormal weight gain (783 1) (R63 5)   3  Arthritis (716 90) (M19 90)   4  Claustrophobia (300 29) (F40 240)   5  Depression (311) (F32 9)   6  Encounter for screening mammogram for breast cancer (V76 12) (Z12 31)   7  Fatigue (780 79) (R53 83)   8  GERD (gastroesophageal reflux disease) (530 81) (K21 9)   9  Hirsutism (704 1) (L68 0)   10  Hyperandrogenism (256 1) (E28 8)   11  Hyperglycemia (790 29) (R73 9)   12  Hyperlipidemia (272 4) (E78 5)   13  Lump in neck (784 2) (R22 1)   14  Memory difficulties (780 93) (R41 3)   15  Migraine headache (346 90) (G43 909)   16  Obesity (278 00) (E66 9)   17  Vitamin D deficiency (268 9) (E55 9)   18  Word finding difficulty (V40 1) (R47 89)    Past Medical History  1  History of Acute diarrhea (787 91) (R19 7)   2  History of Acute upper respiratory infection (465 9) (J06 9)   3  History of Acute viral syndrome (079 99) (B34 9)   4  History of Body aches (780 96) (R52)   5  History of Excessive cerumen in left ear canal (380 4) (H61 22)   6  History of Headache, acute (784 0) (R51)   7  History of diarrhea (V12 79) (Z87 898)   8  History of dizziness (V13 89) (Z87 898)   9  History of headache (V13 89) (Z87 898)   10  History of hypothyroidism (V12 29) (Z86 39)   11   History of nausea (V12 79) (Z87 898)   12  History of sinusitis (V12 69) (Z87 09)   13  History of viral infection (V12 09) (Z86 19)   14  History of weakness (V13 89) (Z87 898)   15  History of Late Effects Of Sprain Or Strain (905 7)   16  History of Premenopausal menorrhagia (627 0) (N92 4)   17  History of Screening for malignant neoplasm of breast (V76 10) (Z12 31)  Active Problems And Past Medical History Reviewed: The active problems and past medical history were reviewed and updated today  Surgical History  1  History of Cholecystectomy   2  History of Hand Surgery  Surgical History Reviewed: The surgical history was reviewed and updated today  Family History  Mother    1  Family history of COPD, severe   2  Family history of Alzheimer's disease (V17 2) (Z82 0)   3  Family history of Multiple Sclerosis  Father    4  Family history of diabetes mellitus (V18 0) (Z83 3)  Paternal Grandmother    11  Family history of diabetes mellitus (V18 0) (Z83 3)   6  Family history of thyroid disease (V18 19) (Z83 49)  Maternal Grandfather    7  Family history of kidney disease (V18 69) (Z84 1)  Paternal Grandfather    6  Family history of diabetes mellitus (V18 0) (Z83 3)   9  Family history of Heart problem  Family History    10  Family history of Diabetes Mellitus (V18 0)   11  Family history of Heart Disease (V17 49)   12  Family history of Non-Hodgkin's Lymphoma   13  Family history of Renal Disorder  Family History Reviewed: The family history was reviewed and updated today  Social History   · Alcohol Use (History)   · Daily caffeine consumption   · Employed   · Former smoker (O72 82) (C22 442)   ·    · Never Used Drugs   · Non smoker / tobacco user (V49 89) (Z78 9)  Social History Reviewed: The social history was reviewed and updated today  Current Meds   1  Spironolactone 50 MG Oral Tablet; 1 Tab daily; Therapy: 84UDS4806 to (Evaluate:76Unt2395); Last Rx:12Jun2017 Ordered   2   Vitamin D3 5000 UNIT Oral Capsule; take 1 capsule daily; Therapy: (Recorded:05Hyf2344) to Recorded  Medication List Reviewed: The medication list was reviewed and updated today  Allergies  1  No Known Drug Allergies    Vitals  Vital Signs    Recorded: 25VIQ3388 08:02AM   Heart Rate 80   Systolic 445   Diastolic 84   Height 5 ft 3 5 in   Weight 214 lb 7 04 oz   BMI Calculated 37 39   BSA Calculated 2     Physical Exam   Constitutional  General appearance: No acute distress, well appearing and well nourished  Eyes  Conjunctiva and lids: No swelling, erythema, or discharge  Pupils: Equal, round and reactive to light  The sclera are anicteric  Extraocular movements are intact  Ears, Nose, Mouth, and Throat  External inspection of ears, nose and lips: Normal    Exam of Head: The head is atraumatic and normocephalic  Neck: The neck is supple  The thyroid is normal in size with no palpable nodules  Pulmonary  Auscultation of lungs: Clear to auscultation bilaterally with normal chest expansion  Cardiovascular  Auscultation of heart: Normal rate and rhythm with no murmurs, gallops or rubs  Examination of extremities for edema and/or varicosities: Normal    Abdomen  Abdomen: Abdomen is soft, non-tender with normal bowel sounds  Lymphatic  Palpation of lymph nodes: No supraclavicular or suboccipital lymphadenopathy  Musculoskeletal  Gait and station: Normal    Inspection/palpation of joints, bones, and muscles: Muscle bulk and tone is normal    Skin  Skin and subcutaneous tissue: Normal skin temperature and color  Neurologic  Motor Strength: Strength is 5/5 bilaterally     Psychiatric  Orientation to person, place and time: Normal    Mood and affect: Affect and attention span are normal        Results/Data  (1) BASIC METABOLIC PROFILE [id number] 07:08AM Jayla Avilez Order Number: [id number]     Test Name Result Flag Reference   SODIUM 145 mmol/L  136-145   POTASSIUM 4 4 mmol/L  3 5-5 3 CHLORIDE 106 mmol/L  100-108   CARBON DIOXIDE 27 mmol/L  21-32   ANION GAP (CALC) 12 mmol/L  4-13   BLOOD UREA NITROGEN 13 mg/dL  5-25   CREATININE 0 95 mg/dL  0 60-1 30   Standardized to IDMS reference method   CALCIUM 9 7 mg/dL  8 3-10 1   eGFR 69 ml/min/1 73sq m     [facility] Disease Education Program recommendations are as follows: GFR calculation is accurate only with a steady state creatinine Chronic Kidney disease less than 60 ml/min/1 73 sq  meters Kidney failure less than 15 ml/min/1 73 sq  meters  GLUCOSE FASTING 103 mg/dL H 65-99   Specimen collection should occur prior to Sulfasalazine administration due to the potential for falsely depressed results  Specimen collection should occur prior to Sulfapyridine administration due to the potential for falsely elevated results  (1) DHEA-S 25Pph1266 07:08AM [facility] Order Number: [id number]     Test Name Result Flag Reference   DHEA-SULFATE 374 2 ug/dL H 41 2 - 243 7   Performed at:  [address] [place], [place] [id number] : Estrellita Mckinley MD, Phone:  [phone]     (1) VITAMIN D 25-HYDROXY [id number] 07:08AM Jaye [facility] Order Number: [id number]     Test Name Result Flag Reference   VIT D 25-HYDROX 35 2 ng/mL  30 0-100 0   This assay is a certified procedure of the CDC Vitamin D Standardization Certification Program (VDSCP)   Deficiency <20ng/ml  Insufficiency 20-30ng/ml  Sufficient  ng/ml   *Patients undergoing fluorescein dye angiography may retain small amounts of fluorescein in the body for 48-72 hours post procedure  Samples containing fluorescein can produce falsely elevated Vitamin D values  If the patient had this procedure, a specimen should be resubmitted post fluorescein clearance  Health Management  Health Maintenance   Digital Bilateral Screening Mammogram With CAD; every 1 year; Next Due: 38Xoh0783;  Overdue    Future Appointments    Date/Time Provider Specialty Site   12/19/2017 01:00 PM COLBY Luz   Obstetrics/Gynecology [facility] OB[facility]GYN [facility] AND SURG[facility]   02/02/2018 03:40 PM Christie Hu MD Neurology NEUROLOGY [facility] OF [facility]    06/18/2018 08:30 AM Stan Herrera, [facility] Endocrinology [facility] ENDOCRINOLOGY     Signatures   Electronically signed by : COLBY Harman ; Dec 19 2017  9:20AM EST                       (Author)

## 2017-12-21 ENCOUNTER — HOSPITAL ENCOUNTER (OUTPATIENT)
Dept: NEUROLOGY | Facility: HOSPITAL | Age: 52
Discharge: HOME | End: 2017-12-24
Attending: PSYCHIATRY & NEUROLOGY
Payer: COMMERCIAL

## 2017-12-21 DIAGNOSIS — R41.3 MEMORY LOSS: ICD-10-CM

## 2017-12-21 LAB — HPV RRNA GENITAL QL NAA+PROBE: NORMAL

## 2017-12-21 PROCEDURE — 95819 EEG AWAKE AND ASLEEP: CPT

## 2017-12-27 LAB
LAB AP GYN PRIMARY INTERPRETATION: NORMAL
LAB AP LMP: NORMAL
Lab: NORMAL
PATH INTERP SPEC-IMP: NORMAL

## 2018-01-04 ENCOUNTER — ALLSCRIPTS OFFICE VISIT (OUTPATIENT)
Dept: OTHER | Facility: OTHER | Age: 53
End: 2018-01-04

## 2018-01-10 NOTE — RESULT NOTES
Discussion/Summary   ok, appropriate response      Verified Results  (1) CORTISOL SUPPRESSION [id number] 07:09AM Robbie Evans     Test Name Result Flag Reference   CORTISOL, RANDOM <0 5 ug/mL LL

## 2018-01-13 VITALS
HEART RATE: 74 BPM | HEIGHT: 65 IN | WEIGHT: 211.38 LBS | DIASTOLIC BLOOD PRESSURE: 78 MMHG | SYSTOLIC BLOOD PRESSURE: 108 MMHG | BODY MASS INDEX: 35.22 KG/M2

## 2018-01-13 VITALS
HEIGHT: 65 IN | HEART RATE: 72 BPM | SYSTOLIC BLOOD PRESSURE: 110 MMHG | WEIGHT: 209.13 LBS | BODY MASS INDEX: 34.84 KG/M2 | DIASTOLIC BLOOD PRESSURE: 80 MMHG

## 2018-01-13 VITALS
BODY MASS INDEX: 37.05 KG/M2 | SYSTOLIC BLOOD PRESSURE: 120 MMHG | DIASTOLIC BLOOD PRESSURE: 74 MMHG | HEIGHT: 64 IN | WEIGHT: 217 LBS | HEART RATE: 78 BPM

## 2018-01-13 VITALS
DIASTOLIC BLOOD PRESSURE: 76 MMHG | SYSTOLIC BLOOD PRESSURE: 118 MMHG | RESPIRATION RATE: 18 BRPM | WEIGHT: 208.25 LBS | HEART RATE: 70 BPM | BODY MASS INDEX: 34.65 KG/M2

## 2018-01-14 VITALS
HEIGHT: 65 IN | HEART RATE: 73 BPM | DIASTOLIC BLOOD PRESSURE: 82 MMHG | WEIGHT: 217.38 LBS | BODY MASS INDEX: 36.22 KG/M2 | OXYGEN SATURATION: 97 % | SYSTOLIC BLOOD PRESSURE: 118 MMHG

## 2018-01-15 NOTE — RESULT NOTES
Discussion/Summary   DHEA-S slightly high - will  monitor for now      Verified Results  (1) TESTOSTERONE, FREE (DIRECT) AND TOTAL [id number] 07:09AM Dee Jewel Order Number: [id number]     Test Name Result Flag Reference   FREE TESTOSTERONE, DIRECT 0 9 pg/mL  0 0 - 4 2   TESTOSTERONE (TOTAL) 32 ng/dL  3 - 41   Performed at:  [address] [place] [address]  [id number]  : Gurpreet Salinas MD, Phone:  [phone]     (1) DHEA-S [id number] 07:09AM Dee Jewel Order Number: [id number]     Test Name Result Flag Reference   DHEA-SULFATE 320 5 ug/dL H 41 2 - 243 7   Performed at:  [facility] [address]  [id number]  : Gurpreet Salinas MD, Phone:  [phone]       Plan  Vitamin D deficiency    · Vitamin D (Ergocalciferol) 55108 UNIT Oral Capsule; TAKE 1 CAPSULE  WEEKLY   then start OTC Vitamin D3 5,000 iu daily

## 2018-01-17 NOTE — RESULT NOTES
Discussion/Summary   cbc ok, very low vitamin d - start drisdol 50,000 iu once a week for 6 weeks and then start vitamin d3 5000 iu daily ,      Verified Results  (1) T4, FREE [id number] 07:09AM Rosendo Garciaee Order Number: [id number]     Test Name Result Flag Reference   T4,FREE 0 93 ng/dL  0 76-1 46     (1) CBC/PLT/DIFF [id number] 07:09AM [facility] Order Number: [id number]     Test Name Result Flag Reference   WBC COUNT 9 07 Thousand/uL  4 31-10 16   RBC COUNT 4 92 Million/uL  3 81-5 12   HEMOGLOBIN 14 1 g/dL  11 5-15 4   HEMATOCRIT 43 6 %  34 8-46  1   MCV 89 fL  82-98   MCH 28 7 pg  26 8-34 3   MCHC 32 3 g/dL  31 4-37 4   RDW 13 1 %  11 6-15 1   MPV 9 8 fL  8 9-12 7   PLATELET COUNT 604 Thousands/uL  149-390   nRBC AUTOMATED 0 /100 WBCs     NEUTROPHILS RELATIVE PERCENT 66 %  43-75   LYMPHOCYTES RELATIVE PERCENT 29 %  14-44   MONOCYTES RELATIVE PERCENT 4 %  4-12   EOSINOPHILS RELATIVE PERCENT 0 %  0-6   BASOPHILS RELATIVE PERCENT 1 %  0-1   NEUTROPHILS ABSOLUTE COUNT 5 99 Thousands/? ??L  1 85-7 62   LYMPHOCYTES ABSOLUTE COUNT 2 62 Thousands/? ??L  0 60-4 47   MONOCYTES ABSOLUTE COUNT 0 36 Thousand/? ??L  0 17-1 22   EOSINOPHILS ABSOLUTE COUNT 0 01 Thousand/? ??L  0 00-0 61   BASOPHILS ABSOLUTE COUNT 0 06 Thousands/? ??L  0 00-0 10   - Patient Instructions: This bloodwork is non-fasting  Please drink two glasses of water morning of bloodwork  (1) VITAMIN D 25-HYDROXY [id number] 07:09AM Chiniakkirsty [facility] Order Number: [id number]     Test Name Result Flag Reference   VIT D 25-HYDROX 8 8 ng/mL L 30 0-100 0   This assay is a certified procedure of the CDC Vitamin D Standardization Certification Program (VDSCP)     Deficiency <20ng/ml   Insufficiency 20-30ng/ml   Sufficient  ng/ml     *Patients undergoing fluorescein dye angiography may retain small amounts of fluorescein in the body for 48-72 hours post procedure   Samples containing fluorescein can produce falsely elevated Vitamin D values  If the patient had this procedure, a specimen should be resubmitted post fluorescein clearance

## 2018-01-22 VITALS
BODY MASS INDEX: 36.61 KG/M2 | SYSTOLIC BLOOD PRESSURE: 110 MMHG | HEIGHT: 64 IN | HEART RATE: 80 BPM | DIASTOLIC BLOOD PRESSURE: 84 MMHG | WEIGHT: 214.44 LBS

## 2018-01-22 VITALS
DIASTOLIC BLOOD PRESSURE: 62 MMHG | HEIGHT: 64 IN | BODY MASS INDEX: 36.7 KG/M2 | WEIGHT: 215 LBS | SYSTOLIC BLOOD PRESSURE: 122 MMHG

## 2018-01-23 VITALS
BODY MASS INDEX: 36.7 KG/M2 | SYSTOLIC BLOOD PRESSURE: 138 MMHG | WEIGHT: 215 LBS | RESPIRATION RATE: 16 BRPM | DIASTOLIC BLOOD PRESSURE: 78 MMHG | HEART RATE: 82 BPM | HEIGHT: 64 IN

## 2018-01-23 DIAGNOSIS — R73.03 PREDIABETES: ICD-10-CM

## 2018-01-23 NOTE — RESULT NOTES
Discussion/Summary   appt 12/19     Verified Results  (1) DHEA-S 66Hpt9598 07:08AM Aranda [facility] Order Number: [id number]     Test Name Result Flag Reference   DHEA-SULFATE 374 2 ug/dL H 41 2 - 243 7   Performed at:  [facility] [address]  [id number]  : Girish Grove MD, Phone:  [phone]

## 2018-01-23 NOTE — RESULT NOTES
Discussion/Summary   appt 12/19- fasting glucose in prediabetes range  Verified Results  (1) BASIC METABOLIC PROFILE [id number] 07:08AM Senia Sample Order Number: [id number]     Test Name Result Flag Reference   SODIUM 145 mmol/L  136-145   POTASSIUM 4 4 mmol/L  3 5-5 3   CHLORIDE 106 mmol/L  100-108   CARBON DIOXIDE 27 mmol/L  21-32   ANION GAP (CALC) 12 mmol/L  4-13   BLOOD UREA NITROGEN 13 mg/dL  5-25   CREATININE 0 95 mg/dL  0 60-1 30   Standardized to IDMS reference method   CALCIUM 9 7 mg/dL  8 3-10 1   eGFR 69 ml/min/1 73sq m     National Kidney Disease Education Program recommendations are as follows:  GFR calculation is accurate only with a steady state creatinine  Chronic Kidney disease less than 60 ml/min/1 73 sq  meters  Kidney failure less than 15 ml/min/1 73 sq  meters  GLUCOSE FASTING 103 mg/dL H 65-99   Specimen collection should occur prior to Sulfasalazine administration due to the potential for falsely depressed results  Specimen collection should occur prior to Sulfapyridine administration due to the potential for falsely elevated results  (1) VITAMIN D 25-HYDROXY [id number] 07:08AM Senia Sample Order Number: [id number]     Test Name Result Flag Reference   VIT D 25-HYDROX 35 2 ng/mL  30 0-100 0   This assay is a certified procedure of the CDC Vitamin D Standardization Certification Program (VDSCP)     Deficiency <20ng/ml   Insufficiency 20-30ng/ml   Sufficient  ng/ml     *Patients undergoing fluorescein dye angiography may retain small amounts of fluorescein in the body for 48-72 hours post procedure  Samples containing fluorescein can produce falsely elevated Vitamin D values  If the patient had this procedure, a specimen should be resubmitted post fluorescein clearance

## 2018-01-23 NOTE — MISCELLANEOUS
Message  Return to work or school:   Caesar Quinones is under my professional care  She was seen in my office on 1/4/18   She is able to return to work on  1/8/18    She can perform work without limitations           Signatures   Electronically signed by : COLBY Kim ; Jan 4 2018  2:30PM EST                       (Author)

## 2018-02-01 ENCOUNTER — TELEPHONE (OUTPATIENT)
Dept: ENDOCRINOLOGY | Facility: CLINIC | Age: 53
End: 2018-02-01

## 2018-02-01 DIAGNOSIS — E66.9 LIFELONG OBESITY: Primary | ICD-10-CM

## 2018-02-01 NOTE — TELEPHONE ENCOUNTER
Please call it in as she requested   saxenda - 0 6 mg daily for 1 week, 1 2 mg daily for week 2, 1 8 mg daily for week 3 , 2 4 mg daily for week 4,and then 3 mg daily     She will need teaching for pen , please set up as well

## 2018-02-01 NOTE — TELEPHONE ENCOUNTER
Pt called to say that you had previously discussed the possibility of her going on Saxenda  She has decided to go ahead with Yasmani  She would like sent to [facility] but she would like to be called  [phone] when it is sent so that she can have them deliver to Susanne Batista unless there is a way for us to request deliver to [facility]   Thank you

## 2018-02-13 ENCOUNTER — TELEPHONE (OUTPATIENT)
Dept: ENDOCRINOLOGY | Facility: CLINIC | Age: 53
End: 2018-02-13

## 2018-02-13 DIAGNOSIS — R73.03 PREDIABETES: Primary | ICD-10-CM

## 2018-02-13 NOTE — TELEPHONE ENCOUNTER
----- Message from Coralee Habermann, RD sent at 2/13/2018 11:00 AM EST -----  Hi Dr Phil Zambrano,  Pt called to schedule for the saxenda  I have her scheduled for 2/16 with Александр  Can you please add the order in?? Thank you so much!

## 2018-04-06 DIAGNOSIS — E66.9 OBESITY (BMI 30-39.9): Primary | ICD-10-CM

## 2018-06-06 ENCOUNTER — OFFICE VISIT (OUTPATIENT)
Dept: NEUROLOGY | Facility: CLINIC | Age: 53
End: 2018-06-06
Payer: COMMERCIAL

## 2018-06-06 VITALS
BODY MASS INDEX: 35.51 KG/M2 | WEIGHT: 208 LBS | SYSTOLIC BLOOD PRESSURE: 112 MMHG | HEART RATE: 80 BPM | DIASTOLIC BLOOD PRESSURE: 76 MMHG | HEIGHT: 64 IN

## 2018-06-06 DIAGNOSIS — R41.3 MEMORY DIFFICULTIES: Primary | ICD-10-CM

## 2018-06-06 PROCEDURE — 99213 OFFICE O/P EST LOW 20 MIN: CPT | Performed by: PSYCHIATRY & NEUROLOGY

## 2018-06-06 RX ORDER — FAMOTIDINE 20 MG/1
20 TABLET, FILM COATED ORAL DAILY
COMMUNITY
End: 2019-11-20

## 2018-06-06 RX ORDER — SPIRONOLACTONE 50 MG/1
1 TABLET, FILM COATED ORAL 2 TIMES DAILY
COMMUNITY
Start: 2017-06-12 | End: 2018-06-18 | Stop reason: SDUPTHER

## 2018-06-06 RX ORDER — MAG HYDROX/ALUMINUM HYD/SIMETH 400-400-40
1 SUSPENSION, ORAL (FINAL DOSE FORM) ORAL DAILY
COMMUNITY

## 2018-06-06 NOTE — PROGRESS NOTES
Dominique Cano is a 46 y o  female who returns in follow-up today with memory difficulty    Assessment:  1  Memory difficulties        Plan:  Follow-up p r n  Discussion:  Eloy Gifford feels cognitively she is much clear since she was here last and workup was negative for any significant abnormalities including a normal EEG and blood work  Her MRI demonstrated some nonspecific white matter changes and cognitively she has remained stable objectively  She would like to follow up with me on an as-needed basis      Subjective:    HPI  Eloy Gifford reports that since here last she feels she is doing much better  She is not sure whether was related to the stress of a new job, however she states that she transition to the new job without difficulty and has been doing well cognitively  She states that family members have also reported that they noticed no cognitive issues  She denies any other health issues      Past Medical History:   Diagnosis Date    Claustrophobia     Hirsutism     Hyperandrogenism     Hyperglycemia     Hyperlipidemia     Memory difficulties     Migraine headache     Vitamin D deficiency     Word finding difficulty        Family History:  Family History   Problem Relation Age of Onset   Romeo Alexus COPD Mother    Romeo Alexus Alzheimer's disease Mother     Multiple sclerosis Mother     Diabetes Father     Heart disease Father     Kidney disease Maternal Grandfather     Diabetes Paternal Grandmother     Thyroid disease Paternal Grandmother     Diabetes Paternal Grandfather     Heart disease Paternal Grandfather        Past Surgical History:  Past Surgical History:   Procedure Laterality Date    CHOLECYSTECTOMY      HAND SURGERY         Social History:   reports that she has quit smoking  She has never used smokeless tobacco  She reports that she drinks alcohol  She reports that she does not use drugs  Allergies:  Patient has no known allergies        Current Outpatient Prescriptions:     Cholecalciferol (VITAMIN D3) 5000 units CAPS, Take 1 capsule by mouth daily, Disp: , Rfl:     famotidine (PEPCID) 20 mg tablet, Take 20 mg by mouth daily, Disp: , Rfl:     Insulin Pen Needle (BD PEN NEEDLE JOEL U/F) 32G X 4 MM MISC, by Does not apply route daily, Disp: 90 each, Rfl: 0    Liraglutide -Weight Management (SAXENDA) 18 MG/3ML SOPN, 0 6 mg daily for 1 week, 1 2 mg daily for week 2, 1 8 mg daily for week 3 , 2 4 mg daily for week 4,and then 3 mg daily   , Disp: 4 pen, Rfl: 3    spironolactone (ALDACTONE) 50 mg tablet, Take 1 tablet by mouth 2 (two) times a day, Disp: , Rfl:     I have reviewed the past medical, social and family history, current medications, allergies, vitals, review of systems and updated this information as appropriate today     Objective:    Vitals:  Blood pressure 112/76, pulse 80, height 5' 4" (1 626 m), weight 94 3 kg (208 lb)  Physical Exam    Neurological Exam  GENERAL:  Well-developed well-nourished woman in no acute distress  HEENT/NECK: Head is atraumatic normocephalic, neck is supple  NEUROLOGIC:  Mental Status: Awake and alert without aphasia  She scored 29/30 on [place] (off on the date)  Cranial Nerves: Extraocular movements are full  Face is symmetrical  Coordination:  Gait is stable            ROS:    Review of Systems   Constitutional: Negative for chills, fatigue and fever  HENT: Negative for congestion, postnasal drip, sinus pain, sinus pressure, sore throat, tinnitus and trouble swallowing  Eyes: Negative for pain, discharge and visual disturbance  Respiratory: Negative for cough, shortness of breath and wheezing  Cardiovascular: Negative  Gastrointestinal: Negative for abdominal distention, abdominal pain, nausea and vomiting  Endocrine: Negative for cold intolerance and heat intolerance  Genitourinary: Negative for difficulty urinating, frequency, hematuria and urgency  Musculoskeletal: Positive for back pain   Negative for arthralgias, gait problem, neck pain and neck stiffness  Skin: Negative for rash and wound  Allergic/Immunologic: Negative for environmental allergies and food allergies  Neurological: Negative  Hematological: Negative  Psychiatric/Behavioral: Positive for sleep disturbance  Negative for confusion and decreased concentration

## 2018-06-11 DIAGNOSIS — R73.9 HYPERGLYCEMIA: Primary | ICD-10-CM

## 2018-06-11 DIAGNOSIS — E78.2 MIXED HYPERLIPIDEMIA: ICD-10-CM

## 2018-06-11 DIAGNOSIS — E55.9 VITAMIN D DEFICIENCY: ICD-10-CM

## 2018-06-11 PROBLEM — F32.A DEPRESSION: Status: ACTIVE | Noted: 2017-02-10

## 2018-06-11 PROBLEM — E28.8 HYPERANDROGENISM: Status: ACTIVE | Noted: 2017-04-03

## 2018-06-11 PROBLEM — R63.5 ABNORMAL WEIGHT GAIN: Status: ACTIVE | Noted: 2017-04-03

## 2018-06-11 PROBLEM — R73.03 PREDIABETES: Status: ACTIVE | Noted: 2017-12-19

## 2018-06-11 PROBLEM — M19.90 ARTHRITIS: Status: ACTIVE | Noted: 2017-11-22

## 2018-06-11 PROBLEM — E66.9 OBESITY: Status: ACTIVE | Noted: 2017-02-10

## 2018-06-11 PROBLEM — E78.5 HYPERLIPIDEMIA: Status: ACTIVE | Noted: 2017-08-23

## 2018-06-11 PROBLEM — R22.1 LUMP IN NECK: Status: ACTIVE | Noted: 2017-07-24

## 2018-06-11 PROBLEM — K21.9 GERD (GASTROESOPHAGEAL REFLUX DISEASE): Status: ACTIVE | Noted: 2017-11-22

## 2018-06-11 PROBLEM — F40.240 CLAUSTROPHOBIA: Status: ACTIVE | Noted: 2017-11-22

## 2018-06-12 DIAGNOSIS — R73.03 PREDIABETES: Primary | ICD-10-CM

## 2018-06-12 DIAGNOSIS — E66.9 LIFELONG OBESITY: ICD-10-CM

## 2018-06-14 ENCOUNTER — TELEPHONE (OUTPATIENT)
Dept: INTERNAL MEDICINE CLINIC | Facility: CLINIC | Age: 53
End: 2018-06-14

## 2018-06-15 ENCOUNTER — APPOINTMENT (OUTPATIENT)
Dept: LAB | Facility: HOSPITAL | Age: 53
End: 2018-06-15
Attending: INTERNAL MEDICINE
Payer: COMMERCIAL

## 2018-06-15 DIAGNOSIS — E66.9 LIFELONG OBESITY: ICD-10-CM

## 2018-06-15 DIAGNOSIS — E78.2 MIXED HYPERLIPIDEMIA: ICD-10-CM

## 2018-06-15 DIAGNOSIS — R73.03 PREDIABETES: ICD-10-CM

## 2018-06-15 LAB
25(OH)D3 SERPL-MCNC: 29.1 NG/ML (ref 30–100)
ALBUMIN SERPL BCP-MCNC: 3.9 G/DL (ref 3.5–5)
ALP SERPL-CCNC: 75 U/L (ref 46–116)
ALT SERPL W P-5'-P-CCNC: 25 U/L (ref 12–78)
ANION GAP SERPL CALCULATED.3IONS-SCNC: 10 MMOL/L (ref 4–13)
AST SERPL W P-5'-P-CCNC: 9 U/L (ref 5–45)
BASOPHILS # BLD AUTO: 0.09 THOUSANDS/ΜL (ref 0–0.1)
BASOPHILS NFR BLD AUTO: 1 % (ref 0–1)
BILIRUB SERPL-MCNC: 0.8 MG/DL (ref 0.2–1)
BUN SERPL-MCNC: 17 MG/DL (ref 5–25)
CALCIUM SERPL-MCNC: 9.6 MG/DL (ref 8.3–10.1)
CHLORIDE SERPL-SCNC: 104 MMOL/L (ref 100–108)
CHOLEST SERPL-MCNC: 203 MG/DL (ref 50–200)
CO2 SERPL-SCNC: 24 MMOL/L (ref 21–32)
CREAT SERPL-MCNC: 0.95 MG/DL (ref 0.6–1.3)
EOSINOPHIL # BLD AUTO: 0.08 THOUSAND/ΜL (ref 0–0.61)
EOSINOPHIL NFR BLD AUTO: 1 % (ref 0–6)
ERYTHROCYTE [DISTWIDTH] IN BLOOD BY AUTOMATED COUNT: 13.4 % (ref 11.6–15.1)
EST. AVERAGE GLUCOSE BLD GHB EST-MCNC: 111 MG/DL
GFR SERPL CREATININE-BSD FRML MDRD: 68 ML/MIN/1.73SQ M
GLUCOSE P FAST SERPL-MCNC: 101 MG/DL (ref 65–99)
HBA1C MFR BLD: 5.5 % (ref 4.2–6.3)
HCT VFR BLD AUTO: 43.5 % (ref 34.8–46.1)
HDLC SERPL-MCNC: 45 MG/DL (ref 40–60)
HGB BLD-MCNC: 14.1 G/DL (ref 11.5–15.4)
IMM GRANULOCYTES # BLD AUTO: 0.03 THOUSAND/UL (ref 0–0.2)
IMM GRANULOCYTES NFR BLD AUTO: 0 % (ref 0–2)
LDLC SERPL CALC-MCNC: 123 MG/DL (ref 0–100)
LYMPHOCYTES # BLD AUTO: 2.59 THOUSANDS/ΜL (ref 0.6–4.47)
LYMPHOCYTES NFR BLD AUTO: 33 % (ref 14–44)
MCH RBC QN AUTO: 29.4 PG (ref 26.8–34.3)
MCHC RBC AUTO-ENTMCNC: 32.4 G/DL (ref 31.4–37.4)
MCV RBC AUTO: 91 FL (ref 82–98)
MONOCYTES # BLD AUTO: 0.59 THOUSAND/ΜL (ref 0.17–1.22)
MONOCYTES NFR BLD AUTO: 7 % (ref 4–12)
NEUTROPHILS # BLD AUTO: 4.6 THOUSANDS/ΜL (ref 1.85–7.62)
NEUTS SEG NFR BLD AUTO: 58 % (ref 43–75)
NONHDLC SERPL-MCNC: 158 MG/DL
NRBC BLD AUTO-RTO: 0 /100 WBCS
PLATELET # BLD AUTO: 270 THOUSANDS/UL (ref 149–390)
PMV BLD AUTO: 9.6 FL (ref 8.9–12.7)
POTASSIUM SERPL-SCNC: 4.5 MMOL/L (ref 3.5–5.3)
PROT SERPL-MCNC: 7.6 G/DL (ref 6.4–8.2)
RBC # BLD AUTO: 4.8 MILLION/UL (ref 3.81–5.12)
SODIUM SERPL-SCNC: 138 MMOL/L (ref 136–145)
TRIGL SERPL-MCNC: 173 MG/DL
TSH SERPL DL<=0.05 MIU/L-ACNC: 2.59 UIU/ML (ref 0.36–3.74)
WBC # BLD AUTO: 7.98 THOUSAND/UL (ref 4.31–10.16)

## 2018-06-15 PROCEDURE — 85025 COMPLETE CBC W/AUTO DIFF WBC: CPT

## 2018-06-15 PROCEDURE — 80061 LIPID PANEL: CPT

## 2018-06-15 PROCEDURE — 84443 ASSAY THYROID STIM HORMONE: CPT

## 2018-06-15 PROCEDURE — 36415 COLL VENOUS BLD VENIPUNCTURE: CPT

## 2018-06-15 PROCEDURE — 80053 COMPREHEN METABOLIC PANEL: CPT

## 2018-06-15 PROCEDURE — 83036 HEMOGLOBIN GLYCOSYLATED A1C: CPT

## 2018-06-15 PROCEDURE — 82306 VITAMIN D 25 HYDROXY: CPT

## 2018-06-18 ENCOUNTER — OFFICE VISIT (OUTPATIENT)
Dept: ENDOCRINOLOGY | Facility: CLINIC | Age: 53
End: 2018-06-18
Payer: COMMERCIAL

## 2018-06-18 VITALS
DIASTOLIC BLOOD PRESSURE: 62 MMHG | WEIGHT: 204 LBS | HEART RATE: 74 BPM | BODY MASS INDEX: 34.83 KG/M2 | SYSTOLIC BLOOD PRESSURE: 110 MMHG | HEIGHT: 64 IN

## 2018-06-18 DIAGNOSIS — R73.03 PREDIABETES: ICD-10-CM

## 2018-06-18 DIAGNOSIS — E66.9 OBESITY (BMI 30-39.9): ICD-10-CM

## 2018-06-18 DIAGNOSIS — E78.2 MIXED HYPERLIPIDEMIA: ICD-10-CM

## 2018-06-18 DIAGNOSIS — E55.9 VITAMIN D DEFICIENCY: ICD-10-CM

## 2018-06-18 DIAGNOSIS — E66.9 LIFELONG OBESITY: ICD-10-CM

## 2018-06-18 DIAGNOSIS — IMO0001 CLASS 2 OBESITY WITH SERIOUS COMORBIDITY AND BODY MASS INDEX (BMI) OF 35.0 TO 35.9 IN ADULT, UNSPECIFIED OBESITY TYPE: ICD-10-CM

## 2018-06-18 DIAGNOSIS — L68.0 HIRSUTISM: Primary | ICD-10-CM

## 2018-06-18 PROCEDURE — 99214 OFFICE O/P EST MOD 30 MIN: CPT | Performed by: PHYSICIAN ASSISTANT

## 2018-06-18 RX ORDER — SPIRONOLACTONE 50 MG/1
50 TABLET, FILM COATED ORAL 2 TIMES DAILY
Qty: 180 TABLET | Refills: 1 | Status: SHIPPED | OUTPATIENT
Start: 2018-06-18 | End: 2018-09-24 | Stop reason: SDUPTHER

## 2018-06-18 NOTE — LETTER
June 18, 2018     Annette Huerta MD  [address] [place] [id number]    Patient: Carol Meier   YOB: 1965   Date of Visit: 6/18/2018       Dear Dr Caesar Harrell:    Thank you for referring Carol Meier to me for evaluation  Below are my notes for this consultation  If you have questions, please do not hesitate to call me  I look forward to following your patient along with you  Sincerely,        Kerrie England PA-C        CC: No Recipients  Kerrie England PA-C  6/18/2018  9:01 AM  Sign at close encounter    Established Patient Progress Note       Chief Complaint   Patient presents with    Abnormal Endocrine Labs    Vitamin D Deficiency    Obesity        History of Present Illness:     Carol Meier is a 46 y o  female with a history of hirsutism, Obesity, PreDiabetes,  and Vitamin D Deficiency  For the Hirsutism, she has had improvement with spironolactone  For the Obesity with comorbidities of  Prediabetes and hyperlipidemia, she has been taking Saxenda  She has lost about 14 pounds  No Side effects  She has had a decreased appetite and eating smaller portions  She has been walking outside and is starting to ride her bike and pulling her small dog in a trailer  She has history of thyroid dysfunction treated with levothyroxine many years ago, no recent treatment  For Vitamin D Deficiency, she is taking supplements 5000 units adily        Patient Active Problem List   Diagnosis    Memory difficulties    Abnormal weight gain    Arthritis    Claustrophobia    Depression    GERD (gastroesophageal reflux disease)    Hirsutism    Hyperandrogenism    Hyperglycemia    Hyperlipidemia    Lump in neck    Migraine headache    Obesity    Prediabetes    Vitamin D deficiency      Past Medical History:   Diagnosis Date    Claustrophobia     Hirsutism     Hyperandrogenism     Hyperglycemia     Hyperlipidemia     Memory difficulties     Migraine headache  Vitamin D deficiency     Word finding difficulty       Past Surgical History:   Procedure Laterality Date    CHOLECYSTECTOMY      HAND SURGERY        Family History   Problem Relation Age of Onset   Learta January COPD Mother    Learta January Alzheimer's disease Mother     Multiple sclerosis Mother     Diabetes Father     Heart disease Father     Kidney disease Maternal Grandfather     Diabetes Paternal Grandmother     Thyroid disease Paternal Grandmother     Diabetes Paternal Grandfather     Heart disease Paternal Grandfather      Social History   Substance Use Topics    Smoking status: Former Smoker    Smokeless tobacco: Never Used    Alcohol use Yes      Comment: occasional     No Known Allergies    Current Outpatient Prescriptions:     Cholecalciferol (VITAMIN D3) 5000 units CAPS, Take 1 capsule by mouth daily, Disp: , Rfl:     famotidine (PEPCID) 20 mg tablet, Take 20 mg by mouth daily, Disp: , Rfl:     Insulin Pen Needle (BD PEN NEEDLE JOEL U/F) 32G X 4 MM MISC, by Does not apply route daily for 100 days 1 per day, Disp: 100 each, Rfl: 1    liraglutide (SAXENDA) injection, Inject 0 5 mL (3 mg total) under the skin daily for 30 days, Disp: 15 mL, Rfl: 5    spironolactone (ALDACTONE) 50 mg tablet, Take 1 tablet (50 mg total) by mouth 2 (two) times a day for 90 days, Disp: 180 tablet, Rfl: 1    Review of Systems   Constitutional: Negative for activity change, appetite change, chills, diaphoresis, fatigue, fever and unexpected weight change  HENT: Negative for trouble swallowing and voice change  Eyes: Negative for visual disturbance  Respiratory: Negative for shortness of breath  Cardiovascular: Negative for chest pain and palpitations  Gastrointestinal: Negative for abdominal pain, constipation and diarrhea  Endocrine: Negative for cold intolerance, heat intolerance, polydipsia, polyphagia and polyuria  Genitourinary: Negative for frequency and menstrual problem     Musculoskeletal: Negative for arthralgias and myalgias  Skin: Negative for rash  Allergic/Immunologic: Negative for food allergies  Neurological: Negative for dizziness and tremors  Hematological: Negative for adenopathy  Psychiatric/Behavioral: Negative for sleep disturbance  All other systems reviewed and are negative  Physical Exam:  Body mass index is 35 02 kg/m²  /62   Pulse 74   Ht 5' 4" (1 626 m)   Wt 92 5 kg (204 lb)   BMI 35 02 kg/m²     Wt Readings from Last 3 Encounters:   06/18/18 92 5 kg (204 lb)   06/06/18 94 3 kg (208 lb)   01/04/18 97 5 kg (215 lb)       Physical Exam   Constitutional: She is oriented to person, place, and time  She appears well-developed and well-nourished  No distress  HENT:   Head: Normocephalic and atraumatic  Eyes: Conjunctivae are normal  Pupils are equal, round, and reactive to light  Neck: Normal range of motion  Neck supple  No thyromegaly present  Cardiovascular: Normal rate, regular rhythm and normal heart sounds  Pulmonary/Chest: Effort normal and breath sounds normal  No respiratory distress  She has no wheezes  She has no rales  Abdominal: Soft  Bowel sounds are normal  She exhibits no distension  There is no tenderness  Musculoskeletal: Normal range of motion  She exhibits no edema  Neurological: She is alert and oriented to person, place, and time  Skin: Skin is warm and dry  Psychiatric: She has a normal mood and affect  Vitals reviewed        Labs:       Lab Results   Component Value Date    CREATININE 0 95 06/15/2018    CREATININE 0 95 12/15/2017    CREATININE 0 79 08/03/2017    BUN 17 06/15/2018     06/15/2018    K 4 5 06/15/2018     06/15/2018    CO2 24 06/15/2018     eGFR   Date Value Ref Range Status   06/15/2018 68 ml/min/1 73sq m Final       Lab Results   Component Value Date    CHOL 203 (H) 06/15/2018    HDL 45 06/15/2018    TRIG 173 (H) 06/15/2018       Lab Results   Component Value Date    ALT 25 06/15/2018    AST 9 06/15/2018    ALKPHOS 75 06/15/2018    BILITOT 0 80 06/15/2018       Lab Results   Component Value Date    FREET4 0 93 05/16/2017         Impression & Plan:    Problem List Items Addressed This Visit     Hirsutism - Primary     Has improved  Continue spironolactone         Relevant Medications    spironolactone (ALDACTONE) 50 mg tablet    Hyperlipidemia     LDL improving, continue lifestyle modifications and weight loss  Obesity    Prediabetes     A1C has improved to 5 5, Fasting Glucose 101  Continue lifestyle modifications and weight loss and Saxenda  Relevant Orders    Hemoglobin A1C    Vitamin D deficiency     Vitamin D level improved but just below normal, sometimes misses supplements on weekends  Continue Vitamin D 5,000 units daily  Relevant Orders    Vitamin D 25 hydroxy      Other Visit Diagnoses     Lifelong obesity        Relevant Medications    liraglutide (SAXENDA) injection    Obesity (BMI 30-39  9)        Relevant Medications    Insulin Pen Needle (BD PEN NEEDLE JOEL U/F) 32G X 4 MM MISC    Other Relevant Orders    Comprehensive metabolic panel    Lipid Panel with Direct LDL reflex          Orders Placed This Encounter   Procedures    Hemoglobin A1C     Standing Status:   Future     Standing Expiration Date:   6/18/2019    Comprehensive metabolic panel     This is a patient instruction: Patient fasting for 8 hours or longer recommended  Standing Status:   Future     Standing Expiration Date:   6/18/2019    Lipid Panel with Direct LDL reflex     This is a patient instruction: This test requires patient fasting for 10-12 hours or longer  Drinking of black coffee or black tea is acceptable       Standing Status:   Future     Standing Expiration Date:   6/18/2019    Vitamin D 25 hydroxy     Standing Status:   Future     Standing Expiration Date:   6/18/2019       Patient Instructions   Funmi Davis has been actively following with our office  She has done a great job and has lost 14 pounds through diet, exercise, and use of Saxenda for weight loss  Her LDL cholesterol and A1C has improved  Discussed with the patient and all questioned fully answered  She will call me if any problems arise  Follow-up appointment in 6 months       Counseled patient on diagnostic results, prognosis, risk and benefit of treatment options, instruction for management, importance of treatment compliance, Risk  factor reduction and impressions      Kory Maya PA-C

## 2018-06-18 NOTE — PROGRESS NOTES
Established Patient Progress Note       Chief Complaint   Patient presents with    Abnormal Endocrine Labs    Vitamin D Deficiency    Obesity        History of Present Illness:     Eugenia Boss is a 46 y o  female with a history of hirsutism, Obesity, PreDiabetes,  and Vitamin D Deficiency  For the Hirsutism, she has had improvement with spironolactone  For the Obesity with comorbidities of  Prediabetes and hyperlipidemia, she has been taking Saxenda  She has lost about 14 pounds  No Side effects  She has had a decreased appetite and eating smaller portions  She has been walking outside and is starting to ride her bike and pulling her small dog in a trailer  She has history of thyroid dysfunction treated with levothyroxine many years ago, no recent treatment  For Vitamin D Deficiency, she is taking supplements 5000 units adily        Patient Active Problem List   Diagnosis    Memory difficulties    Abnormal weight gain    Arthritis    Claustrophobia    Depression    GERD (gastroesophageal reflux disease)    Hirsutism    Hyperandrogenism    Hyperglycemia    Hyperlipidemia    Lump in neck    Migraine headache    Obesity    Prediabetes    Vitamin D deficiency      Past Medical History:   Diagnosis Date    Claustrophobia     Hirsutism     Hyperandrogenism     Hyperglycemia     Hyperlipidemia     Memory difficulties     Migraine headache     Vitamin D deficiency     Word finding difficulty       Past Surgical History:   Procedure Laterality Date    CHOLECYSTECTOMY      HAND SURGERY        Family History   Problem Relation Age of Onset   Adriane Wheat COPD Mother     Alzheimer's disease Mother     Multiple sclerosis Mother     Diabetes Father     Heart disease Father     Kidney disease Maternal Grandfather     Diabetes Paternal Grandmother     Thyroid disease Paternal Grandmother     Diabetes Paternal Grandfather     Heart disease Paternal Grandfather      Social History   Substance Use Topics    Smoking status: Former Smoker    Smokeless tobacco: Never Used    Alcohol use Yes      Comment: occasional     No Known Allergies    Current Outpatient Prescriptions:     Cholecalciferol (VITAMIN D3) 5000 units CAPS, Take 1 capsule by mouth daily, Disp: , Rfl:     famotidine (PEPCID) 20 mg tablet, Take 20 mg by mouth daily, Disp: , Rfl:     Insulin Pen Needle (BD PEN NEEDLE JOEL U/F) 32G X 4 MM MISC, by Does not apply route daily for 100 days 1 per day, Disp: 100 each, Rfl: 1    liraglutide (SAXENDA) injection, Inject 0 5 mL (3 mg total) under the skin daily for 30 days, Disp: 15 mL, Rfl: 5    spironolactone (ALDACTONE) 50 mg tablet, Take 1 tablet (50 mg total) by mouth 2 (two) times a day for 90 days, Disp: 180 tablet, Rfl: 1    Review of Systems   Constitutional: Negative for activity change, appetite change, chills, diaphoresis, fatigue, fever and unexpected weight change  HENT: Negative for trouble swallowing and voice change  Eyes: Negative for visual disturbance  Respiratory: Negative for shortness of breath  Cardiovascular: Negative for chest pain and palpitations  Gastrointestinal: Negative for abdominal pain, constipation and diarrhea  Endocrine: Negative for cold intolerance, heat intolerance, polydipsia, polyphagia and polyuria  Genitourinary: Negative for frequency and menstrual problem  Musculoskeletal: Negative for arthralgias and myalgias  Skin: Negative for rash  Allergic/Immunologic: Negative for food allergies  Neurological: Negative for dizziness and tremors  Hematological: Negative for adenopathy  Psychiatric/Behavioral: Negative for sleep disturbance  All other systems reviewed and are negative  Physical Exam:  Body mass index is 35 02 kg/m²    /62   Pulse 74   Ht 5' 4" (1 626 m)   Wt 92 5 kg (204 lb)   BMI 35 02 kg/m²    Wt Readings from Last 3 Encounters:   06/18/18 92 5 kg (204 lb)   06/06/18 94 3 kg (208 lb)   01/04/18 97 5 kg (215 lb)       Physical Exam   Constitutional: She is oriented to person, place, and time  She appears well-developed and well-nourished  No distress  HENT:   Head: Normocephalic and atraumatic  Eyes: Conjunctivae are normal  Pupils are equal, round, and reactive to light  Neck: Normal range of motion  Neck supple  No thyromegaly present  Cardiovascular: Normal rate, regular rhythm and normal heart sounds  Pulmonary/Chest: Effort normal and breath sounds normal  No respiratory distress  She has no wheezes  She has no rales  Abdominal: Soft  Bowel sounds are normal  She exhibits no distension  There is no tenderness  Musculoskeletal: Normal range of motion  She exhibits no edema  Neurological: She is alert and oriented to person, place, and time  Skin: Skin is warm and dry  Psychiatric: She has a normal mood and affect  Vitals reviewed  Labs:       Lab Results   Component Value Date    CREATININE 0 95 06/15/2018    CREATININE 0 95 12/15/2017    CREATININE 0 79 08/03/2017    BUN 17 06/15/2018     06/15/2018    K 4 5 06/15/2018     06/15/2018    CO2 24 06/15/2018     eGFR   Date Value Ref Range Status   06/15/2018 68 ml/min/1 73sq m Final       Lab Results   Component Value Date    CHOL 203 (H) 06/15/2018    HDL 45 06/15/2018    TRIG 173 (H) 06/15/2018       Lab Results   Component Value Date    ALT 25 06/15/2018    AST 9 06/15/2018    ALKPHOS 75 06/15/2018    BILITOT 0 80 06/15/2018       Lab Results   Component Value Date    FREET4 0 93 05/16/2017         Impression & Plan:    Problem List Items Addressed This Visit     Hirsutism - Primary     Has improved  Continue spironolactone         Relevant Medications    spironolactone (ALDACTONE) 50 mg tablet    Hyperlipidemia     LDL improving, continue lifestyle modifications and weight loss  Obesity    Prediabetes     A1C has improved to 5 5, Fasting Glucose 101    Continue lifestyle modifications and weight loss and Saxenda  Relevant Orders    Hemoglobin A1C    Vitamin D deficiency     Vitamin D level improved but just below normal, sometimes misses supplements on weekends  Continue Vitamin D 5,000 units daily  Relevant Orders    Vitamin D 25 hydroxy      Other Visit Diagnoses     Lifelong obesity        Relevant Medications    liraglutide (SAXENDA) injection    Obesity (BMI 30-39  9)        Relevant Medications    Insulin Pen Needle (BD PEN NEEDLE JOEL U/F) 32G X 4 MM MISC    Other Relevant Orders    Comprehensive metabolic panel    Lipid Panel with Direct LDL reflex          Orders Placed This Encounter   Procedures    Hemoglobin A1C     Standing Status:   Future     Standing Expiration Date:   6/18/2019    Comprehensive metabolic panel     This is a patient instruction: Patient fasting for 8 hours or longer recommended  Standing Status:   Future     Standing Expiration Date:   6/18/2019    Lipid Panel with Direct LDL reflex     This is a patient instruction: This test requires patient fasting for 10-12 hours or longer  Drinking of black coffee or black tea is acceptable  Standing Status:   Future     Standing Expiration Date:   6/18/2019    Vitamin D 25 hydroxy     Standing Status:   Future     Standing Expiration Date:   6/18/2019       Patient Instructions   Yue Sierra has been actively following with our office  She has done a great job and has lost 14 pounds through diet, exercise, and use of Saxenda for weight loss  Her LDL cholesterol and A1C has improved  Discussed with the patient and all questioned fully answered  She will call me if any problems arise  Follow-up appointment in 6 months       Counseled patient on diagnostic results, prognosis, risk and benefit of treatment options, instruction for management, importance of treatment compliance, Risk  factor reduction and impressions      Marco A Leach PA-C

## 2018-06-18 NOTE — PATIENT INSTRUCTIONS
Rad Hayden has been actively following with our office  She has done a great job and has lost 14 pounds through diet, exercise, and use of Saxenda for weight loss  Her LDL cholesterol and A1C has improved

## 2018-06-18 NOTE — ASSESSMENT & PLAN NOTE
Vitamin D level improved but just below normal, sometimes misses supplements on weekends  Continue Vitamin D 5,000 units daily

## 2018-06-18 NOTE — ASSESSMENT & PLAN NOTE
A1C has improved to 5 5, Fasting Glucose 101  Continue lifestyle modifications and weight loss and Saxenda

## 2018-07-24 ENCOUNTER — OFFICE VISIT (OUTPATIENT)
Dept: URGENT CARE | Facility: CLINIC | Age: 53
End: 2018-07-24
Payer: COMMERCIAL

## 2018-07-24 VITALS
DIASTOLIC BLOOD PRESSURE: 80 MMHG | SYSTOLIC BLOOD PRESSURE: 134 MMHG | HEART RATE: 90 BPM | BODY MASS INDEX: 33.8 KG/M2 | HEIGHT: 64 IN | RESPIRATION RATE: 20 BRPM | OXYGEN SATURATION: 98 % | WEIGHT: 198 LBS | TEMPERATURE: 99.2 F

## 2018-07-24 DIAGNOSIS — R30.0 DYSURIA: Primary | ICD-10-CM

## 2018-07-24 LAB
SL AMB  POCT GLUCOSE, UA: ABNORMAL
SL AMB LEUKOCYTE ESTERASE,UA: ABNORMAL
SL AMB POCT BILIRUBIN,UA: ABNORMAL
SL AMB POCT BLOOD,UA: ABNORMAL
SL AMB POCT CLARITY,UA: ABNORMAL
SL AMB POCT COLOR,UA: YELLOW
SL AMB POCT KETONES,UA: ABNORMAL
SL AMB POCT NITRITE,UA: ABNORMAL
SL AMB POCT PH,UA: 7
SL AMB POCT SPECIFIC GRAVITY,UA: 1.01
SL AMB POCT URINE PROTEIN: ABNORMAL
SL AMB POCT UROBILINOGEN: 0.2

## 2018-07-24 PROCEDURE — 99213 OFFICE O/P EST LOW 20 MIN: CPT

## 2018-07-24 PROCEDURE — 81002 URINALYSIS NONAUTO W/O SCOPE: CPT

## 2018-07-24 PROCEDURE — S9088 SERVICES PROVIDED IN URGENT: HCPCS

## 2018-07-24 PROCEDURE — 87086 URINE CULTURE/COLONY COUNT: CPT

## 2018-07-24 PROCEDURE — 87186 SC STD MICRODIL/AGAR DIL: CPT

## 2018-07-24 PROCEDURE — 87077 CULTURE AEROBIC IDENTIFY: CPT

## 2018-07-24 RX ORDER — NITROFURANTOIN 25; 75 MG/1; MG/1
100 CAPSULE ORAL 2 TIMES DAILY
Qty: 14 CAPSULE | Refills: 0 | Status: SHIPPED | OUTPATIENT
Start: 2018-07-24 | End: 2018-07-31

## 2018-07-24 NOTE — PATIENT INSTRUCTIONS
Dysuria  macrobid twice daily x 7 days  Follow up with PCP in 3-5 days  Proceed to  ER if symptoms worsen  Dysuria   WHAT YOU NEED TO KNOW:   Dysuria is difficulty urinating, or pain, burning, or discomfort with urination  Dysuria is usually a symptom of another problem  DISCHARGE INSTRUCTIONS:   Return to the emergency department if:   · You have severe back, side, or abdominal pain  · You have fever and shaking chills  · You vomit several times in a row  Contact your healthcare provider if:   · Your symptoms do not go away, even after treatment  · You have questions or concerns about your condition or care  Medicines:   · Medicines  may be given to help treat a bacterial infection or help decrease bladder spasms  · Take your medicine as directed  Contact your healthcare provider if you think your medicine is not helping or if you have side effects  Tell him of her if you are allergic to any medicine  Keep a list of the medicines, vitamins, and herbs you take  Include the amounts, and when and why you take them  Bring the list or the pill bottles to follow-up visits  Carry your medicine list with you in case of an emergency  Follow up with your healthcare provider as directed: Your healthcare provider may also refer you to a urologist or nephrologist to have additional testing  Write down your questions so you remember to ask them during your visits  Manage your dysuria:   · Drink more liquids  Liquids help flush out bacteria that may be causing an infection  Ask your healthcare provider how much liquid to drink each day and which liquids are best for you  · Take sitz baths as directed  Fill a bathtub with 4 to 6 inches of warm water  You may also use a sitz bath pan that fits over a toilet  Sit in the sitz bath for 20 minutes  Do this 2 to 3 times a day, or as directed  The warm water can help decrease pain and swelling     © 2017 [employer] Information is for End User's use only and may not be sold, redistributed or otherwise used for commercial purposes  All illustrations and images included in CareNotes® are the copyrighted property of [employer]  or [employer]vin Daugherty  The above information is an  only  It is not intended as medical advice for individual conditions or treatments  Talk to your doctor, nurse or pharmacist before following any medical regimen to see if it is safe and effective for you

## 2018-07-24 NOTE — PROGRESS NOTES
[facility]        NAME: Nuvia Campoverde is a 46 y o  female  : 1965    MRN: [id number]  DATE: 2018  TIME: 3:38 PM    Assessment and Plan   Dysuria [R30 0]  1  Dysuria  nitrofurantoin (MACROBID) 100 mg capsule         Patient Instructions     Dysuria  macrobid twice daily x 7 days  Follow up with PCP in 3-5 days  Proceed to  ER if symptoms worsen  Chief Complaint     Chief Complaint   Patient presents with    Back Pain     C/O pain across low back with no known injury which started yesterday AM      Abdominal Pain     C/O lower abdomen pressure and pain which started at 1 AM  Pt admits to slight nausea but no vomiting  Pt denies diarrhea  Pt denies any burning, frequency with urination  History of Present Illness       47 y/o female c/o low back pain yesterday that became suprapubic pain by 2 am  Denies fever, chills, n/v, vaginal discharge  Patient refused pregnancy test        Review of Systems   Review of Systems   Constitutional: Negative  Respiratory: Negative  Cardiovascular: Negative  Gastrointestinal: Positive for abdominal pain  Negative for anal bleeding, blood in stool, constipation, diarrhea, nausea, rectal pain and vomiting  Genitourinary: Negative  Musculoskeletal: Positive for back pain           Current Medications       Current Outpatient Prescriptions:     Cholecalciferol (VITAMIN D3) 5000 units CAPS, Take 1 capsule by mouth daily, Disp: , Rfl:     famotidine (PEPCID) 20 mg tablet, Take 20 mg by mouth daily, Disp: , Rfl:     Insulin Pen Needle (BD PEN NEEDLE JOEL U/F) 32G X 4 MM MISC, by Does not apply route daily for 100 days 1 per day, Disp: 100 each, Rfl: 1    spironolactone (ALDACTONE) 50 mg tablet, Take 1 tablet (50 mg total) by mouth 2 (two) times a day for 90 days, Disp: 180 tablet, Rfl: 1    liraglutide (SAXENDA) injection, Inject 0 5 mL (3 mg total) under the skin daily for 30 days, Disp: 15 mL, Rfl: 5    nitrofurantoin (MACROBID) 100 mg capsule, Take 1 capsule (100 mg total) by mouth 2 (two) times a day for 7 days, Disp: 14 capsule, Rfl: 0    Current Allergies     Allergies as of 07/24/2018    (No Known Allergies)            The following portions of the patient's history were reviewed and updated as appropriate: allergies, current medications, past family history, past medical history, past social history, past surgical history and problem list      Past Medical History:   Diagnosis Date    Claustrophobia     Hirsutism     Hyperandrogenism     Hyperglycemia     Hyperlipidemia     Memory difficulties     Migraine headache     Vitamin D deficiency     Word finding difficulty        Past Surgical History:   Procedure Laterality Date    CHOLECYSTECTOMY      HAND SURGERY         Family History   Problem Relation Age of Onset    COPD Mother     Alzheimer's disease Mother     Multiple sclerosis Mother     Diabetes Father     Heart disease Father     Kidney disease Maternal Grandfather     Diabetes Paternal Grandmother     Thyroid disease Paternal Grandmother     Diabetes Paternal Grandfather     Heart disease Paternal Grandfather          Medications have been verified  Objective   /80   Pulse 90   Temp 99 2 °F (37 3 °C) (Tympanic)   Resp 20   Ht 5' 4" (1 626 m)   Wt 89 8 kg (198 lb)   SpO2 98%   BMI 33 99 kg/m²        Physical Exam     Physical Exam   Constitutional: She appears well-developed and well-nourished  No distress  Neck: Normal range of motion  Neck supple  Cardiovascular: Normal rate, regular rhythm, normal heart sounds and intact distal pulses  Pulmonary/Chest: Effort normal and breath sounds normal    Lymphadenopathy:     She has no cervical adenopathy  Skin: She is not diaphoretic

## 2018-07-25 ENCOUNTER — OFFICE VISIT (OUTPATIENT)
Dept: INTERNAL MEDICINE CLINIC | Facility: CLINIC | Age: 53
End: 2018-07-25
Payer: COMMERCIAL

## 2018-07-25 VITALS
WEIGHT: 200 LBS | SYSTOLIC BLOOD PRESSURE: 110 MMHG | HEART RATE: 83 BPM | BODY MASS INDEX: 34.33 KG/M2 | OXYGEN SATURATION: 97 % | DIASTOLIC BLOOD PRESSURE: 70 MMHG

## 2018-07-25 DIAGNOSIS — N39.0 URINARY TRACT INFECTION WITHOUT HEMATURIA, SITE UNSPECIFIED: Primary | ICD-10-CM

## 2018-07-25 PROCEDURE — 99213 OFFICE O/P EST LOW 20 MIN: CPT | Performed by: INTERNAL MEDICINE

## 2018-07-25 NOTE — PROGRESS NOTES
INTERNAL MEDICINE FOLLOW-UP OFFICE VISIT  [facility] AT [facility]    NAME: Lsibeth Maynard  AGE: 46 y o  SEX: female  : 1965   MRN: [id number]    DATE: 2018  TIME: 1:06 PM    Assessment and Plan     Diagnoses and all orders for this visit:    Urinary tract infection without hematuria, site unspecified        - Counseling Documentation: patient was counseled regarding: diagnostic results, instructions for management, risk factor reductions, prognosis, patient and family education, impressions, risks and benefits of treatment options and importance of compliance with treatment  - Medication Side Effects: Adverse side effects of medications were reviewed with the patient/guardian today  Return to office in: as needed    Chief Complaint     Chief Complaint   Patient presents with    Lower abdomen pressure and pain on left side  History of Present Illness     Urinary Tract Infection    This is a new problem  The current episode started in the past 7 days  The problem has been unchanged  The quality of the pain is described as aching  The pain is at a severity of 4/10  The pain is mild  There has been no fever  She is sexually active  There is no history of pyelonephritis  Associated symptoms include chills, flank pain and frequency  Pertinent negatives include no hematuria, nausea, urgency or vomiting  She has tried antibiotics for the symptoms  The treatment provided mild relief  The following portions of the patient's history were reviewed and updated as appropriate: allergies, current medications, past family history, past medical history, past social history, past surgical history and problem list     Review of Systems     Review of Systems   Constitutional: Positive for chills  Negative for diaphoresis, fatigue and fever     HENT: Negative for congestion, ear discharge, ear pain, hearing loss, postnasal drip, rhinorrhea, sinus pain, sinus pressure, sneezing, sore throat and voice change  Eyes: Negative for pain, discharge, redness and visual disturbance  Respiratory: Negative for cough, chest tightness, shortness of breath and wheezing  Cardiovascular: Negative for chest pain, palpitations and leg swelling  Gastrointestinal: Negative for abdominal distention, abdominal pain, blood in stool, constipation, diarrhea, nausea and vomiting  Endocrine: Negative for cold intolerance, heat intolerance, polydipsia, polyphagia and polyuria  Genitourinary: Positive for flank pain and frequency  Negative for dysuria, hematuria and urgency  Musculoskeletal: Negative for arthralgias, back pain, gait problem, joint swelling, myalgias, neck pain and neck stiffness  Skin: Negative for rash  Neurological: Negative for dizziness, tremors, syncope, facial asymmetry, speech difficulty, weakness, light-headedness, numbness and headaches  Hematological: Does not bruise/bleed easily  Psychiatric/Behavioral: Negative for behavioral problems, confusion and sleep disturbance  The patient is not nervous/anxious  Active Problem List     Patient Active Problem List   Diagnosis    Memory difficulties    Abnormal weight gain    Arthritis    Claustrophobia    Depression    GERD (gastroesophageal reflux disease)    Hirsutism    Hyperandrogenism    Hyperglycemia    Hyperlipidemia    Lump in neck    Migraine headache    Obesity    Prediabetes    Vitamin D deficiency       Objective     /70 (BP Location: Left arm, Patient Position: Sitting)   Pulse 83   Wt 90 7 kg (200 lb)   SpO2 97%   BMI 34 33 kg/m²     Physical Exam   Constitutional: She is oriented to person, place, and time  She appears well-developed and well-nourished  No distress  HENT:   Head: Normocephalic and atraumatic     Right Ear: External ear normal    Left Ear: External ear normal    Nose: Nose normal    Mouth/Throat: Oropharynx is clear and moist    Eyes: Conjunctivae and EOM are normal  Right eye exhibits no discharge  Left eye exhibits no discharge  No scleral icterus  Neck: Normal range of motion  Neck supple  No JVD present  No tracheal deviation present  No thyromegaly present  Cardiovascular: Normal rate, regular rhythm, normal heart sounds and intact distal pulses  Exam reveals no gallop and no friction rub  No murmur heard  Pulmonary/Chest: Effort normal and breath sounds normal  No respiratory distress  She has no wheezes  She has no rales  She exhibits no tenderness  Abdominal: Soft  Bowel sounds are normal  She exhibits no distension  There is no tenderness  There is no rebound and no guarding  Musculoskeletal: Normal range of motion  She exhibits no edema or tenderness  Lymphadenopathy:     She has no cervical adenopathy  Neurological: She is alert and oriented to person, place, and time  No cranial nerve deficit  She exhibits normal muscle tone  Coordination normal    Skin: Skin is warm and dry  No rash noted  She is not diaphoretic  No erythema  Psychiatric: She has a normal mood and affect   Judgment normal        Pertinent Laboratory/Diagnostic Studies:  CBC:   Lab Results   Component Value Date/Time    WBC 7 98 06/15/2018 07:30 AM    RBC 4 80 06/15/2018 07:30 AM    HGB 14 1 06/15/2018 07:30 AM    HCT 43 5 06/15/2018 07:30 AM    MCV 91 06/15/2018 07:30 AM    MCH 29 4 06/15/2018 07:30 AM    MCHC 32 4 06/15/2018 07:30 AM    RDW 13 4 06/15/2018 07:30 AM    MPV 9 6 06/15/2018 07:30 AM     06/15/2018 07:30 AM    NRBC 0 06/15/2018 07:30 AM    NEUTOPHILPCT 58 06/15/2018 07:30 AM    LYMPHOPCT 33 06/15/2018 07:30 AM    MONOPCT 7 06/15/2018 07:30 AM    EOSPCT 1 06/15/2018 07:30 AM    BASOPCT 1 06/15/2018 07:30 AM    NEUTROABS 4 60 06/15/2018 07:30 AM    LYMPHSABS 2 59 06/15/2018 07:30 AM    MONOSABS 0 59 06/15/2018 07:30 AM    EOSABS 0 08 06/15/2018 07:30 AM     Chemistry Profile:   Lab Results   Component Value Date/Time     06/15/2018 07:30 AM    K 4 5 06/15/2018 07:30 AM     06/15/2018 07:30 AM    CO2 24 06/15/2018 07:30 AM    ANIONGAP 10 06/15/2018 07:30 AM    BUN 17 06/15/2018 07:30 AM    CREATININE 0 95 06/15/2018 07:30 AM    GLUF 101 (H) 06/15/2018 07:30 AM    GLUF 83 06/06/2014 07:15 AM    GLUCOSE 92 02/01/2017 07:49 AM    SLAMBGLUCOSE neg 07/24/2018 03:44 PM    CALCIUM 9 6 06/15/2018 07:30 AM    AST 9 06/15/2018 07:30 AM    ALT 25 06/15/2018 07:30 AM    ALKPHOS 75 06/15/2018 07:30 AM    PROT 7 6 06/15/2018 07:30 AM    BILITOT 0 80 06/15/2018 07:30 AM    EGFR 68 06/15/2018 07:30 AM     Urinalysis:       Invalid input(s): URIBILINOGEN   Urine Micro:     Urine Dip:   Results from last 6 Months  Lab Units 07/24/18  1544   SL AMB COLOR,UA  yellow   SL AMB SPECIFIC GRAVITY  1 015   SL AMB PH,UA  7 0   SL AMB LEUKOCYTE ESTERASE, UA  moderate   SL AMB NITRITE,UA  neg   SL AMB GLUCOSE UA  neg   SL AMB KETONES,UA  neg   SL AMB BILIRUBIN,UA  neg   SL AMB BLOOD,UA  trace       Current Medications       Current Outpatient Prescriptions:     Cholecalciferol (VITAMIN D3) 5000 units CAPS, Take 1 capsule by mouth daily, Disp: , Rfl:     famotidine (PEPCID) 20 mg tablet, Take 20 mg by mouth daily, Disp: , Rfl:     Insulin Pen Needle (BD PEN NEEDLE JOEL U/F) 32G X 4 MM MISC, by Does not apply route daily for 100 days 1 per day, Disp: 100 each, Rfl: 1    nitrofurantoin (MACROBID) 100 mg capsule, Take 1 capsule (100 mg total) by mouth 2 (two) times a day for 7 days, Disp: 14 capsule, Rfl: 0    spironolactone (ALDACTONE) 50 mg tablet, Take 1 tablet (50 mg total) by mouth 2 (two) times a day for 90 days, Disp: 180 tablet, Rfl: 1    liraglutide (SAXENDA) injection, Inject 0 5 mL (3 mg total) under the skin daily for 30 days, Disp: 15 mL, Rfl: 5    Health Maintenance     Health Maintenance   Topic Date Due    HIV SCREENING  1965    Hepatitis C Screening  1965    CRC Screening: Colonoscopy  1965    DTaP,Tdap,and Td Vaccines (1 - Tdap) 02/10/1985    MAMMOGRAM 02/27/2018    INFLUENZA VACCINE  09/01/2018    PAP SMEAR  12/19/2020       There is no immunization history on file for this patient        Joe Fan MD  [address] [place] [address] of BEHAVIORAL MEDICINE AT [facility]

## 2018-07-25 NOTE — LETTER
July 25, 2018     Patient: Robert Devries   YOB: 1965   Date of Visit: 7/25/2018       To Whom it May Concern:    Robert Devries is under my professional care  She was seen in my office on 7/25/2018  She may return to work on 7/27/18  If you have any questions or concerns, please don't hesitate to call           Sincerely,          Valerie Yeboah MD        CC: Joegabinomatt Devries

## 2018-07-27 LAB
BACTERIA UR CULT: ABNORMAL
BACTERIA UR CULT: ABNORMAL

## 2018-09-24 DIAGNOSIS — L68.0 HIRSUTISM: ICD-10-CM

## 2018-09-24 RX ORDER — SPIRONOLACTONE 50 MG/1
TABLET, FILM COATED ORAL
Qty: 180 TABLET | Refills: 0 | Status: SHIPPED | OUTPATIENT
Start: 2018-09-24 | End: 2019-02-25 | Stop reason: SDUPTHER

## 2018-12-05 ENCOUNTER — APPOINTMENT (OUTPATIENT)
Dept: LAB | Facility: HOSPITAL | Age: 53
End: 2018-12-05
Attending: INTERNAL MEDICINE
Payer: COMMERCIAL

## 2018-12-05 DIAGNOSIS — R73.03 PREDIABETES: ICD-10-CM

## 2018-12-05 DIAGNOSIS — R73.9 HYPERGLYCEMIA: ICD-10-CM

## 2018-12-05 DIAGNOSIS — E55.9 VITAMIN D DEFICIENCY: ICD-10-CM

## 2018-12-05 DIAGNOSIS — E66.9 OBESITY (BMI 30-39.9): ICD-10-CM

## 2018-12-05 LAB
25(OH)D3 SERPL-MCNC: 38.8 NG/ML (ref 30–100)
ALBUMIN SERPL BCP-MCNC: 4.3 G/DL (ref 3.5–5)
ALP SERPL-CCNC: 90 U/L (ref 46–116)
ALT SERPL W P-5'-P-CCNC: 22 U/L (ref 12–78)
ANION GAP SERPL CALCULATED.3IONS-SCNC: 10 MMOL/L (ref 4–13)
AST SERPL W P-5'-P-CCNC: 11 U/L (ref 5–45)
BILIRUB SERPL-MCNC: 0.9 MG/DL (ref 0.2–1)
BUN SERPL-MCNC: 15 MG/DL (ref 5–25)
CALCIUM SERPL-MCNC: 9.8 MG/DL (ref 8.3–10.1)
CHLORIDE SERPL-SCNC: 104 MMOL/L (ref 100–108)
CHOLEST SERPL-MCNC: 226 MG/DL (ref 50–200)
CO2 SERPL-SCNC: 28 MMOL/L (ref 21–32)
CREAT SERPL-MCNC: 0.99 MG/DL (ref 0.6–1.3)
EST. AVERAGE GLUCOSE BLD GHB EST-MCNC: 120 MG/DL
GFR SERPL CREATININE-BSD FRML MDRD: 65 ML/MIN/1.73SQ M
GLUCOSE P FAST SERPL-MCNC: 99 MG/DL (ref 65–99)
HBA1C MFR BLD: 5.8 % (ref 4.2–6.3)
HDLC SERPL-MCNC: 50 MG/DL (ref 40–60)
LDLC SERPL CALC-MCNC: 152 MG/DL (ref 0–100)
POTASSIUM SERPL-SCNC: 4.7 MMOL/L (ref 3.5–5.3)
PROT SERPL-MCNC: 8.1 G/DL (ref 6.4–8.2)
SODIUM SERPL-SCNC: 142 MMOL/L (ref 136–145)
TRIGL SERPL-MCNC: 120 MG/DL

## 2018-12-05 PROCEDURE — 83036 HEMOGLOBIN GLYCOSYLATED A1C: CPT

## 2018-12-05 PROCEDURE — 80061 LIPID PANEL: CPT

## 2018-12-05 PROCEDURE — 82306 VITAMIN D 25 HYDROXY: CPT

## 2018-12-05 PROCEDURE — 80053 COMPREHEN METABOLIC PANEL: CPT

## 2018-12-05 PROCEDURE — 36415 COLL VENOUS BLD VENIPUNCTURE: CPT

## 2019-01-17 ENCOUNTER — OFFICE VISIT (OUTPATIENT)
Dept: OBGYN CLINIC | Facility: CLINIC | Age: 54
End: 2019-01-17
Payer: COMMERCIAL

## 2019-01-17 VITALS — SYSTOLIC BLOOD PRESSURE: 128 MMHG | DIASTOLIC BLOOD PRESSURE: 78 MMHG

## 2019-01-17 DIAGNOSIS — N90.7 VULVAR CYST: Primary | ICD-10-CM

## 2019-01-17 PROCEDURE — 99213 OFFICE O/P EST LOW 20 MIN: CPT | Performed by: PHYSICIAN ASSISTANT

## 2019-01-17 NOTE — PROGRESS NOTES
Jorgito Astudillo  1965    S:  46 y o  female here for a problem visit  She has a four day history of a left sided vulvar lump that was initially very painful but now is just tender and somewhat itchy  She has been using warm baths without much change  She has seen no drainage from this  She does not report fevers       Past Medical History:   Diagnosis Date    Claustrophobia     Hirsutism     Hyperandrogenism     Hyperglycemia     Hyperlipidemia     Hypothyroidism     last assessed 2/10/2017    Memory difficulties     Migraine headache     Premenopausal menorrhagia 01/10/2006    SAB (spontaneous )     Vitamin D deficiency     Word finding difficulty      Family History   Problem Relation Age of Onset    COPD Mother         severe    Alzheimer's disease Mother     Multiple sclerosis Mother     Depression Mother     Diabetes Father         DM    Heart disease Father         cardiac disorder     Hypertension Father     Kidney disease Maternal Grandfather     Diabetes Paternal Grandmother         DM    Thyroid disease Paternal Grandmother     Breast cancer Paternal Grandmother     Diabetes Paternal Grandfather         DM    Heart disease Paternal Grandfather         cardiac disorder     Diabetes Sister         DM    Thyroid disease Sister     Heart disease Maternal Grandmother         cardiac disorder     Hiatal hernia Maternal Grandmother     Brain cancer Son     Lymphoma Son     Kidney disease Family         renal disorder     Social History     Social History    Marital status: /Civil Union     Spouse name: N/A    Number of children: N/A    Years of education: N/A     Occupational History    cardiology tech [employer]     Social History Main Topics    Smoking status: Former Smoker    Smokeless tobacco: Never Used    Alcohol use Yes      Comment: occasional    Drug use: No    Sexual activity: Not Asked     Other Topics Concern    None     Social History Narrative    Daily caffeine consumption           O:  /78 (BP Location: Right arm, Patient Position: Sitting, Cuff Size: Standard)   She appears well and is in no distress  Abdomen is soft and nontender  External genitals are normal without lesions or rashes  There is a tender cyst in the left labia minora which is not draining  It is not erythematous or warm  A/P:  Vulvar cyst   Continue warm baths/compresses  Call if this does not resolve within 2 weeks  Can use NSAIDs for pain, swelling  Call with any worsening

## 2019-02-25 DIAGNOSIS — L68.0 HIRSUTISM: ICD-10-CM

## 2019-02-25 RX ORDER — SPIRONOLACTONE 50 MG/1
50 TABLET, FILM COATED ORAL 2 TIMES DAILY
Qty: 180 TABLET | Refills: 0 | Status: SHIPPED | OUTPATIENT
Start: 2019-02-25 | End: 2019-06-03 | Stop reason: SDUPTHER

## 2019-02-25 RX ORDER — SPIRONOLACTONE 50 MG/1
50 TABLET, FILM COATED ORAL 2 TIMES DAILY
Qty: 180 TABLET | Refills: 0 | Status: CANCELLED | OUTPATIENT
Start: 2019-02-25

## 2019-02-27 ENCOUNTER — OFFICE VISIT (OUTPATIENT)
Dept: ENDOCRINOLOGY | Facility: CLINIC | Age: 54
End: 2019-02-27
Payer: COMMERCIAL

## 2019-02-27 VITALS
HEART RATE: 76 BPM | BODY MASS INDEX: 34.24 KG/M2 | DIASTOLIC BLOOD PRESSURE: 70 MMHG | SYSTOLIC BLOOD PRESSURE: 130 MMHG | WEIGHT: 199.5 LBS

## 2019-02-27 DIAGNOSIS — R73.03 PREDIABETES: ICD-10-CM

## 2019-02-27 DIAGNOSIS — E66.9 OBESITY (BMI 30-39.9): ICD-10-CM

## 2019-02-27 DIAGNOSIS — E55.9 VITAMIN D DEFICIENCY: Primary | ICD-10-CM

## 2019-02-27 DIAGNOSIS — E66.9 LIFELONG OBESITY: ICD-10-CM

## 2019-02-27 PROCEDURE — 99214 OFFICE O/P EST MOD 30 MIN: CPT | Performed by: INTERNAL MEDICINE

## 2019-02-27 NOTE — PROGRESS NOTES
Mihir Ayala 53 y o  female MRN: [id number]    Encounter: [id number]      Assessment/Plan     Problem List Items Addressed This Visit        Other    Prediabetes      Counseled on increased risk of developing type 2 diabetes-focus on dietary and lifestyle modifications and weight loss         Relevant Orders    Comprehensive metabolic panel Lab Collect    HEMOGLOBIN A1C W/ EAG ESTIMATION Lab Collect    T4, free Lab Collect    TSH, 3rd generation Lab Collect    Lipid panel Lab Collect Lab Collect    Vitamin D deficiency - Primary      Continue supplementations         Relevant Orders    Vitamin D 25 hydroxy Lab Collect    Obesity (BMI 30-39  9)      Encouraged dietary and lifestyle modifications and weight loss - continue saxenda         Relevant Medications    Insulin Pen Needle (BD PEN NEEDLE JOEL U/F) 32G X 4 MM MISC    Other Relevant Orders    Comprehensive metabolic panel Lab Collect    Lipid panel Lab Collect Lab Collect      Other Visit Diagnoses     Lifelong obesity        Relevant Medications    liraglutide (SAXENDA) injection        CC: Diabetes    History of Present Illness     HPI:   58-year-old female with history of pre diabetes, obesity,  Vitamin-D deficiency here for follow-up    Hair loss - improved   Generally feels well- does complain of occasional fatigue and diarrhea    Weight loss - about 15 lbs since starting saxenda  For vitamin-D deficiency she is currently on vitamin D3 5000 International Units daily    Review of Systems   Constitutional: Positive for fatigue  Negative for unexpected weight change  Respiratory: Negative for cough and shortness of breath  Cardiovascular: Negative for palpitations and leg swelling  Gastrointestinal: Positive for diarrhea  Negative for nausea and vomiting  Endocrine: Negative for polydipsia and polyuria  Musculoskeletal: Positive for myalgias  Negative for arthralgias and gait problem  Skin: Negative for wound     Neurological: Negative for weakness  Psychiatric/Behavioral: Positive for sleep disturbance  All other systems reviewed and are negative        Historical Information   Past Medical History:   Diagnosis Date    Claustrophobia     Hirsutism     Hyperandrogenism     Hyperglycemia     Hyperlipidemia     Hypothyroidism     last assessed 2/10/2017    Memory difficulties     Migraine headache     Premenopausal menorrhagia 01/10/2006    SAB (spontaneous )     Vitamin D deficiency     Word finding difficulty      Past Surgical History:   Procedure Laterality Date    CHOLECYSTECTOMY      COLONOSCOPY      HAND SURGERY      x2 Right hand - lesion excisions    HYSTEROSCOPY W/ ENDOMETRIAL ABLATION       Social History   Social History     Substance and Sexual Activity   Alcohol Use Yes    Comment: occasional     Social History     Substance and Sexual Activity   Drug Use No     Social History     Tobacco Use   Smoking Status Former Smoker   Smokeless Tobacco Never Used     Family History:   Family History   Problem Relation Age of Onset    COPD Mother         severe    Alzheimer's disease Mother     Multiple sclerosis Mother     Depression Mother     Diabetes Father         DM    Heart disease Father         cardiac disorder     Hypertension Father     Kidney disease Maternal Grandfather     Diabetes Paternal Grandmother         DM    Thyroid disease Paternal Grandmother     Breast cancer Paternal Grandmother     Diabetes Paternal Grandfather         DM    Heart disease Paternal Grandfather         cardiac disorder     Diabetes Sister         DM    Thyroid disease Sister     Heart disease Maternal Grandmother         cardiac disorder     Hiatal hernia Maternal Grandmother     Brain cancer Son     Lymphoma Son     Kidney disease Family         renal disorder       Meds/Allergies   Current Outpatient Medications   Medication Sig Dispense Refill    Cholecalciferol (VITAMIN D3) 5000 units CAPS Take 1 capsule by mouth daily      famotidine (PEPCID) 20 mg tablet Take 20 mg by mouth daily      spironolactone (ALDACTONE) 50 mg tablet Take 1 tablet (50 mg total) by mouth 2 (two) times a day 180 tablet 0    Insulin Pen Needle (BD PEN NEEDLE JOEL U/F) 32G X 4 MM MISC by Does not apply route daily for 100 days 1 per day 100 each 1    liraglutide (SAXENDA) injection Inject 0 5 mL (3 mg total) under the skin daily for 30 days 15 mL 5     No current facility-administered medications for this visit  No Known Allergies    Objective   Vitals: Blood pressure 130/70, pulse 76, weight 90 5 kg (199 lb 8 oz)  Physical Exam   Constitutional: She is oriented to person, place, and time  She appears well-developed and well-nourished  No distress  HENT:   Head: Normocephalic and atraumatic  Eyes: EOM are normal  No scleral icterus  Neck: Normal range of motion  Neck supple  No thyromegaly present  Cardiovascular: Normal rate, regular rhythm and normal heart sounds  No murmur heard  Pulmonary/Chest: Effort normal and breath sounds normal  No respiratory distress  She has no wheezes  Abdominal: Soft  Bowel sounds are normal  She exhibits no distension  Musculoskeletal: Normal range of motion  She exhibits no edema or deformity  Neurological: She is alert and oriented to person, place, and time  Skin: Skin is warm and dry  Psychiatric: She has a normal mood and affect  Her behavior is normal  Judgment and thought content normal        The history was obtained from the review of the chart, patient      Lab Results:   Lab Results   Component Value Date/Time    Hemoglobin A1C 5 8 12/05/2018 07:36 AM    Hemoglobin A1C 5 5 06/15/2018 07:30 AM    WBC 7 98 06/15/2018 07:30 AM    Hemoglobin 14 1 06/15/2018 07:30 AM    Hematocrit 43 5 06/15/2018 07:30 AM    MCV 91 06/15/2018 07:30 AM    Platelets 664 00/00/5365 07:30 AM    BUN 15 12/05/2018 07:36 AM    BUN 17 06/15/2018 07:30 AM    Potassium 4 7 12/05/2018 07:36 AM Potassium 4 5 06/15/2018 07:30 AM    Chloride 104 12/05/2018 07:36 AM    Chloride 104 06/15/2018 07:30 AM    CO2 28 12/05/2018 07:36 AM    CO2 24 06/15/2018 07:30 AM    Creatinine 0 99 12/05/2018 07:36 AM    Creatinine 0 95 06/15/2018 07:30 AM    AST 11 12/05/2018 07:36 AM    AST 9 06/15/2018 07:30 AM    ALT 22 12/05/2018 07:36 AM    ALT 25 06/15/2018 07:30 AM    Albumin 4 3 12/05/2018 07:36 AM    Albumin 3 9 06/15/2018 07:30 AM    HDL, Direct 50 12/05/2018 07:36 AM    HDL, Direct 45 06/15/2018 07:30 AM    Triglycerides 120 12/05/2018 07:36 AM    Triglycerides 173 (H) 06/15/2018 07:30 AM               Portions of the record may have been created with voice recognition software  Occasional wrong word or "sound a like" substitutions may have occurred due to the inherent limitations of voice recognition software  Read the chart carefully and recognize, using context, where substitutions have occurred

## 2019-02-27 NOTE — ASSESSMENT & PLAN NOTE
Counseled on increased risk of developing type 2 diabetes-focus on dietary and lifestyle modifications and weight loss

## 2019-03-15 ENCOUNTER — TELEPHONE (OUTPATIENT)
Dept: GASTROENTEROLOGY | Facility: CLINIC | Age: 54
End: 2019-03-15

## 2019-03-15 ENCOUNTER — OFFICE VISIT (OUTPATIENT)
Dept: LAB | Facility: HOSPITAL | Age: 54
End: 2019-03-15
Attending: INTERNAL MEDICINE
Payer: COMMERCIAL

## 2019-03-15 DIAGNOSIS — K21.9 GASTROESOPHAGEAL REFLUX DISEASE, ESOPHAGITIS PRESENCE NOT SPECIFIED: Primary | ICD-10-CM

## 2019-03-15 DIAGNOSIS — R07.9 CHEST PAIN: Primary | ICD-10-CM

## 2019-03-15 DIAGNOSIS — R07.9 CHEST PAIN: ICD-10-CM

## 2019-03-15 LAB
ATRIAL RATE: 127 BPM
P AXIS: 52 DEGREES
PR INTERVAL: 146 MS
QRS AXIS: -1 DEGREES
QRSD INTERVAL: 90 MS
QT INTERVAL: 318 MS
QTC INTERVAL: 462 MS
T WAVE AXIS: 45 DEGREES
VENTRICULAR RATE: 127 BPM

## 2019-03-15 PROCEDURE — 93010 ELECTROCARDIOGRAM REPORT: CPT | Performed by: INTERNAL MEDICINE

## 2019-03-15 PROCEDURE — 93005 ELECTROCARDIOGRAM TRACING: CPT

## 2019-03-15 RX ORDER — PANTOPRAZOLE SODIUM 40 MG/1
40 TABLET, DELAYED RELEASE ORAL 2 TIMES DAILY
Qty: 30 TABLET | Refills: 2 | Status: SHIPPED | OUTPATIENT
Start: 2019-03-15 | End: 2019-06-04 | Stop reason: SDUPTHER

## 2019-03-15 NOTE — TELEPHONE ENCOUNTER
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

## 2019-03-18 ENCOUNTER — TELEPHONE (OUTPATIENT)
Dept: GASTROENTEROLOGY | Facility: CLINIC | Age: 54
End: 2019-03-18

## 2019-03-21 ENCOUNTER — OFFICE VISIT (OUTPATIENT)
Dept: CARDIOLOGY CLINIC | Facility: CLINIC | Age: 54
End: 2019-03-21
Payer: COMMERCIAL

## 2019-03-21 VITALS
HEART RATE: 96 BPM | OXYGEN SATURATION: 98 % | RESPIRATION RATE: 18 BRPM | DIASTOLIC BLOOD PRESSURE: 74 MMHG | SYSTOLIC BLOOD PRESSURE: 118 MMHG | BODY MASS INDEX: 34.24 KG/M2 | HEIGHT: 64 IN

## 2019-03-21 DIAGNOSIS — R10.13 EPIGASTRIC PAIN: Primary | ICD-10-CM

## 2019-03-21 DIAGNOSIS — E78.5 HYPERLIPIDEMIA: ICD-10-CM

## 2019-03-21 PROCEDURE — 99203 OFFICE O/P NEW LOW 30 MIN: CPT | Performed by: INTERNAL MEDICINE

## 2019-03-21 NOTE — PROGRESS NOTES
Cardiology Outpatient Follow up Note    Odessa Wilkerson 53 y o  female MRN: [id number]    03/21/19          Assessment/Plan:  1  Epigastric pain and burning- her acute episode was self limited  Possibly related to GERD; she has GI follow up in the near future  She denies any exertional chest pain or dyspnea  ECG with sinus tachycardia and no evidence of ischemia  Will hold off on an ischemic evaluation at this time  She was advised to notify us of progressive symptoms  2  Hyperlipidemia- ASCVD risk: 2 2%- lifestyle modifications suggested  3  Impaired fasting glucose- A1c: 5 8; lifestyle modifications suggested  Follow up: as needed    No diagnosis found  HPI: Odessa Wilkerson is a 53y o  year old female with no prior cardiac history who presents for evaluation of an episode of self-limited epigastric pain  Last Friday she experienced severe epigastric burning and nausea that lasted through the weekend  She denies associated dyspnea or palpitations  Her symptoms resolved by this past Monday  She feels that her symptoms were consistent with GERD however was more severe  Since her symptoms improved she has been carrying furniture and performing her normal activities without  limitation  An ECG was done that revealed sinus tachycardia with no evidence of ischemia  She denies any other concerns at this time  Family History: grandfather and father with hx of MI; sister with gestational diabetes    Social history: denies tobacco and recreational drug use; social alcohol use       Patient Active Problem List   Diagnosis    Memory difficulties    Abnormal weight gain    Arthritis    Claustrophobia    Depression    GERD (gastroesophageal reflux disease)    Hirsutism    Hyperandrogenism    Hyperglycemia    Hyperlipidemia    Lump in neck    Migraine headache    Prediabetes    Vitamin D deficiency    Obesity (BMI 30-39  9)       No Known Allergies      Current Outpatient Medications:    Cholecalciferol (VITAMIN D3) 5000 units CAPS, Take 1 capsule by mouth daily, Disp: , Rfl:     famotidine (PEPCID) 20 mg tablet, Take 20 mg by mouth daily, Disp: , Rfl:     Insulin Pen Needle (BD PEN NEEDLE JOEL U/F) 32G X 4 MM MISC, by Does not apply route daily for 100 days 1 per day, Disp: 100 each, Rfl: 1    liraglutide (SAXENDA) injection, Inject 0 5 mL (3 mg total) under the skin daily for 30 days, Disp: 15 mL, Rfl: 5    pantoprazole (PROTONIX) 40 mg tablet, Take 1 tablet (40 mg total) by mouth 2 (two) times a day 30 minutes before breakfast, then 30 minutes before dinner, Disp: 30 tablet, Rfl: 2    spironolactone (ALDACTONE) 50 mg tablet, Take 1 tablet (50 mg total) by mouth 2 (two) times a day, Disp: 180 tablet, Rfl: 0    Past Medical History:   Diagnosis Date    Claustrophobia     Hirsutism     Hyperandrogenism     Hyperglycemia     Hyperlipidemia     Hypothyroidism     last assessed 2/10/2017    Memory difficulties     Migraine headache     Premenopausal menorrhagia 01/10/2006    SAB (spontaneous )     Vitamin D deficiency     Word finding difficulty        Family History   Problem Relation Age of Onset    COPD Mother         severe    Alzheimer's disease Mother     Multiple sclerosis Mother     Depression Mother     Diabetes Father         DM    Heart disease Father         cardiac disorder     Hypertension Father     Kidney disease Maternal Grandfather     Diabetes Paternal Grandmother         DM    Thyroid disease Paternal Grandmother     Breast cancer Paternal Grandmother     Diabetes Paternal Grandfather         DM    Heart disease Paternal Grandfather         cardiac disorder     Diabetes Sister         DM    Thyroid disease Sister     Heart disease Maternal Grandmother         cardiac disorder     Hiatal hernia Maternal Grandmother     Brain cancer Son     Lymphoma Son     Kidney disease Family         renal disorder       Past Surgical History: Procedure Laterality Date    CHOLECYSTECTOMY      COLONOSCOPY      HAND SURGERY      x2 Right hand - lesion excisions    HYSTEROSCOPY W/ ENDOMETRIAL ABLATION         Social History     Socioeconomic History    Marital status: /Civil Union     Spouse name: Not on file    Number of children: Not on file    Years of education: Not on file    Highest education level: Not on file   Occupational History    Occupation: cardiology tech     Employer: [employer]   Social Needs    Financial resource strain: Not on file    Food insecurity:     Worry: Not on file     Inability: Not on file    Transportation needs:     Medical: Not on file     Non-medical: Not on file   Tobacco Use    Smoking status: Former Smoker    Smokeless tobacco: Never Used   Substance and Sexual Activity    Alcohol use: Yes     Comment: occasional    Drug use: No    Sexual activity: Not on file   Lifestyle    Physical activity:     Days per week: Not on file     Minutes per session: Not on file    Stress: Not on file   Relationships    Social connections:     Talks on phone: Not on file     Gets together: Not on file     Attends Sikhism service: Not on file     Active member of club or organization: Not on file     Attends meetings of clubs or organizations: Not on file     Relationship status: Not on file    Intimate partner violence:     Fear of current or ex partner: Not on file     Emotionally abused: Not on file     Physically abused: Not on file     Forced sexual activity: Not on file   Other Topics Concern    Not on file   Social History Narrative    Daily caffeine consumption       Review of Systems   Constitution: Negative for diaphoresis, weight gain and weight loss  HENT: Negative for congestion  Cardiovascular: Negative for chest pain, dyspnea on exertion, irregular heartbeat, leg swelling, near-syncope, orthopnea, palpitations, paroxysmal nocturnal dyspnea and syncope     Respiratory: Negative for shortness of breath  Hematologic/Lymphatic: Does not bruise/bleed easily  Skin: Negative for rash  Musculoskeletal: Negative for myalgias  Gastrointestinal: Positive for heartburn and nausea  Negative for vomiting  Neurological: Negative for excessive daytime sleepiness and light-headedness  Psychiatric/Behavioral: The patient is not nervous/anxious  Vitals: /74   Pulse 96   Resp 18   Ht 5' 4" (1 626 m)   SpO2 98%   BMI 34 24 kg/m²       Physical Exam:     GEN: Alert and oriented x 3, in no acute distress  Well appearing and well nourished  HEENT: Sclera anicteric, conjunctivae pink, mucous membranes moist  Oropharynx clear  NECK: Supple, no carotid bruits, no significant JVD  Trachea midline, no thyromegaly  HEART: Regular rhythm, normal S1 and S2, no murmurs, clicks, gallops or rubs  PMI nondisplaced, no thrills  LUNGS: Clear to auscultation bilaterally; no wheezes, rales, or rhonchi  No increased work of breathing or signs of respiratory distress  ABDOMEN: Soft, nontender, nondistended, normoactive bowel sounds  EXTREMITIES: Skin warm and well perfused, no clubbing, cyanosis, or edema  NEURO: No focal findings  Normal speech  Mood and affect normal    SKIN: Normal without suspicious lesions on exposed skin        Lab Results:       Lab Results   Component Value Date    HGBA1C 5 8 12/05/2018    HGBA1C 5 5 06/15/2018    HGBA1C 5 9 08/03/2017     Lab Results   Component Value Date    CHOL 193 08/11/2015    CHOL 175 06/06/2014     Lab Results   Component Value Date    HDL 50 12/05/2018    HDL 45 06/15/2018    HDL 55 08/03/2017     Lab Results   Component Value Date    LDLCALC 152 (H) 12/05/2018    LDLCALC 123 (H) 06/15/2018    LDLCALC 139 (H) 08/03/2017     Lab Results   Component Value Date    TRIG 120 12/05/2018    TRIG 173 (H) 06/15/2018    TRIG 168 (H) 08/03/2017     No results found for: CHOLHDL

## 2019-03-25 ENCOUNTER — TELEPHONE (OUTPATIENT)
Dept: GASTROENTEROLOGY | Facility: CLINIC | Age: 54
End: 2019-03-25

## 2019-03-25 NOTE — TELEPHONE ENCOUNTER
Regarding: Visit Follow-Up Question  Contact: [phone]  ----- Message from [address] [place] [address] [address], Generic sent at 3/25/2019  8:33 AM EDT -----    Your office created an appointment for me and we played phone tag but never connected  There was no message on my cell that I gave as a contact number  Then they change the appointment and alex me as a No Show for failure to appear  This is unacceptable as I was not informed of this appointment  I agreed to follow-up care and plan to make my own appointment since I was not informed of the one last week  As you can tell, I am very upset by this

## 2019-04-04 ENCOUNTER — TELEPHONE (OUTPATIENT)
Dept: GASTROENTEROLOGY | Facility: CLINIC | Age: 54
End: 2019-04-04

## 2019-05-21 ENCOUNTER — OFFICE VISIT (OUTPATIENT)
Dept: GASTROENTEROLOGY | Facility: CLINIC | Age: 54
End: 2019-05-21
Payer: COMMERCIAL

## 2019-05-21 VITALS
BODY MASS INDEX: 33.84 KG/M2 | HEART RATE: 73 BPM | SYSTOLIC BLOOD PRESSURE: 128 MMHG | DIASTOLIC BLOOD PRESSURE: 74 MMHG | WEIGHT: 197.13 LBS

## 2019-05-21 DIAGNOSIS — K21.9 GASTROESOPHAGEAL REFLUX DISEASE, ESOPHAGITIS PRESENCE NOT SPECIFIED: Primary | ICD-10-CM

## 2019-05-21 PROCEDURE — 99203 OFFICE O/P NEW LOW 30 MIN: CPT | Performed by: PHYSICIAN ASSISTANT

## 2019-05-31 ENCOUNTER — TELEPHONE (OUTPATIENT)
Dept: GASTROENTEROLOGY | Facility: CLINIC | Age: 54
End: 2019-05-31

## 2019-06-03 DIAGNOSIS — L68.0 HIRSUTISM: ICD-10-CM

## 2019-06-03 RX ORDER — SPIRONOLACTONE 50 MG/1
50 TABLET, FILM COATED ORAL 2 TIMES DAILY
Qty: 180 TABLET | Refills: 1 | Status: SHIPPED | OUTPATIENT
Start: 2019-06-03 | End: 2019-12-30 | Stop reason: SDUPTHER

## 2019-06-04 ENCOUNTER — HOSPITAL ENCOUNTER (OUTPATIENT)
Dept: GASTROENTEROLOGY | Facility: HOSPITAL | Age: 54
Setting detail: OUTPATIENT SURGERY
Discharge: HOME | End: 2019-06-04
Attending: INTERNAL MEDICINE | Admitting: INTERNAL MEDICINE
Payer: COMMERCIAL

## 2019-06-04 ENCOUNTER — ANESTHESIA (OUTPATIENT)
Dept: GASTROENTEROLOGY | Facility: HOSPITAL | Age: 54
End: 2019-06-04

## 2019-06-04 ENCOUNTER — ANESTHESIA EVENT (OUTPATIENT)
Dept: GASTROENTEROLOGY | Facility: HOSPITAL | Age: 54
End: 2019-06-04

## 2019-06-04 VITALS
DIASTOLIC BLOOD PRESSURE: 58 MMHG | BODY MASS INDEX: 33.87 KG/M2 | HEART RATE: 72 BPM | RESPIRATION RATE: 13 BRPM | TEMPERATURE: 97.9 F | SYSTOLIC BLOOD PRESSURE: 120 MMHG | WEIGHT: 198.38 LBS | OXYGEN SATURATION: 97 % | HEIGHT: 64 IN

## 2019-06-04 DIAGNOSIS — K21.9 GASTROESOPHAGEAL REFLUX DISEASE, ESOPHAGITIS PRESENCE NOT SPECIFIED: ICD-10-CM

## 2019-06-04 PROCEDURE — 43239 EGD BIOPSY SINGLE/MULTIPLE: CPT | Performed by: INTERNAL MEDICINE

## 2019-06-04 PROCEDURE — 88305 TISSUE EXAM BY PATHOLOGIST: CPT | Performed by: PATHOLOGY

## 2019-06-04 PROCEDURE — 88342 IMHCHEM/IMCYTCHM 1ST ANTB: CPT | Performed by: PATHOLOGY

## 2019-06-04 RX ORDER — PANTOPRAZOLE SODIUM 40 MG/1
40 TABLET, DELAYED RELEASE ORAL DAILY
Qty: 30 TABLET | Refills: 2 | Status: SHIPPED | OUTPATIENT
Start: 2019-06-04 | End: 2020-10-21

## 2019-06-04 RX ORDER — SODIUM CHLORIDE, SODIUM LACTATE, POTASSIUM CHLORIDE, CALCIUM CHLORIDE 600; 310; 30; 20 MG/100ML; MG/100ML; MG/100ML; MG/100ML
125 INJECTION, SOLUTION INTRAVENOUS CONTINUOUS
Status: DISCONTINUED | OUTPATIENT
Start: 2019-06-04 | End: 2019-06-08 | Stop reason: HOSPADM

## 2019-06-04 RX ORDER — SODIUM CHLORIDE, SODIUM LACTATE, POTASSIUM CHLORIDE, CALCIUM CHLORIDE 600; 310; 30; 20 MG/100ML; MG/100ML; MG/100ML; MG/100ML
INJECTION, SOLUTION INTRAVENOUS CONTINUOUS PRN
Status: DISCONTINUED | OUTPATIENT
Start: 2019-06-04 | End: 2019-06-04 | Stop reason: SURG

## 2019-06-04 RX ORDER — LIDOCAINE HYDROCHLORIDE 10 MG/ML
INJECTION, SOLUTION INFILTRATION; PERINEURAL AS NEEDED
Status: DISCONTINUED | OUTPATIENT
Start: 2019-06-04 | End: 2019-06-04 | Stop reason: SURG

## 2019-06-04 RX ORDER — PROPOFOL 10 MG/ML
INJECTION, EMULSION INTRAVENOUS AS NEEDED
Status: DISCONTINUED | OUTPATIENT
Start: 2019-06-04 | End: 2019-06-04 | Stop reason: SURG

## 2019-06-04 RX ADMIN — LIDOCAINE HYDROCHLORIDE 50 MG: 10 INJECTION, SOLUTION INFILTRATION; PERINEURAL at 13:01

## 2019-06-04 RX ADMIN — SODIUM CHLORIDE, SODIUM LACTATE, POTASSIUM CHLORIDE, AND CALCIUM CHLORIDE: .6; .31; .03; .02 INJECTION, SOLUTION INTRAVENOUS at 12:40

## 2019-06-04 RX ADMIN — PROPOFOL 130 MG: 10 INJECTION, EMULSION INTRAVENOUS at 13:01

## 2019-06-06 ENCOUNTER — TELEPHONE (OUTPATIENT)
Dept: GASTROENTEROLOGY | Facility: CLINIC | Age: 54
End: 2019-06-06

## 2019-06-07 ENCOUNTER — TELEPHONE (OUTPATIENT)
Dept: GASTROENTEROLOGY | Facility: CLINIC | Age: 54
End: 2019-06-07

## 2019-07-03 ENCOUNTER — OFFICE VISIT (OUTPATIENT)
Dept: GASTROENTEROLOGY | Facility: CLINIC | Age: 54
End: 2019-07-03
Payer: COMMERCIAL

## 2019-07-03 VITALS — HEART RATE: 115 BPM | SYSTOLIC BLOOD PRESSURE: 102 MMHG | DIASTOLIC BLOOD PRESSURE: 78 MMHG

## 2019-07-03 DIAGNOSIS — K21.9 GASTROESOPHAGEAL REFLUX DISEASE, ESOPHAGITIS PRESENCE NOT SPECIFIED: Primary | ICD-10-CM

## 2019-07-03 DIAGNOSIS — R09.89 GLOBUS SENSATION: ICD-10-CM

## 2019-07-03 PROBLEM — R09.A2 GLOBUS SENSATION: Status: ACTIVE | Noted: 2019-07-03

## 2019-07-03 PROCEDURE — 99214 OFFICE O/P EST MOD 30 MIN: CPT | Performed by: PHYSICIAN ASSISTANT

## 2019-07-03 NOTE — PROGRESS NOTES
[facility]  Funmi Davis 53 y o  female MRN: [id number]       CC: Follow-up after EGD    HPI: Gavin Jalloh is a 53year old female who is known to us for history of chronic GERD  Patient was last seen for follow-up in May  At that time, she was started on Protonix twice daily  Patient underwent EGD on 6/4 that was normal   She had the biopsies  She was instructed to decrease her Protonix to 40 mg daily  Patient reports that she has been doing this for the past month, and her symptoms have been controlled  Occasionally, she does experience globus sensation  However, she reports that she feels this is exacerbated by stress  Otherwise, she denies dysphagia or odynophagia  Patient's last colonoscopy was likely a little over 5 years ago with [facility]  To her knowledge, colonoscopy was normal   She does not have a family history of colon cancer  Records are not available to us  Review of Systems:    CONSTITUTIONAL: Denies any fever, chills, or rigors  Good appetite, and no recent weight loss  HEENT: No earache or tinnitus  Denies hearing loss or visual disturbances  CARDIOVASCULAR: No chest pain or palpitations  RESPIRATORY: Denies any cough, hemoptysis, shortness of breath or dyspnea on exertion  GASTROINTESTINAL: As noted in the History of Present Illness  GENITOURINARY: No problems with urination  Denies any hematuria or dysuria  NEUROLOGIC: No dizziness or vertigo, denies headaches  MUSCULOSKELETAL: Denies any muscle or joint pain  SKIN: Denies skin rashes or itching  ENDOCRINE: Denies excessive thirst  Denies intolerance to heat or cold  PSYCHOSOCIAL: Denies depression or anxiety  Denies any recent memory loss         Current Outpatient Medications   Medication Sig Dispense Refill    Cholecalciferol (VITAMIN D3) 5000 units CAPS Take 1 capsule by mouth daily      famotidine (PEPCID) 20 mg tablet Take 20 mg by mouth daily       pantoprazole (PROTONIX) 40 mg tablet Take 1 tablet (40 mg total) by mouth daily 30 minutes before breakfast, then 30 minutes before dinner 30 tablet 2    spironolactone (ALDACTONE) 50 mg tablet Take 1 tablet (50 mg total) by mouth 2 (two) times a day 180 tablet 1    Insulin Pen Needle (BD PEN NEEDLE JOEL U/F) 32G X 4 MM MISC by Does not apply route daily for 100 days 1 per day 100 each 1    liraglutide (SAXENDA) injection Inject 0 5 mL (3 mg total) under the skin daily for 30 days 15 mL 5     No current facility-administered medications for this visit        Past Medical History:   Diagnosis Date    Claustrophobia     Hirsutism     Hyperandrogenism     Hyperglycemia     Hypothyroidism     last assessed 2/10/2017    Memory difficulties     Migraine headache     Premenopausal menorrhagia 01/10/2006    SAB (spontaneous )     Vitamin D deficiency     Word finding difficulty      Past Surgical History:   Procedure Laterality Date    CHOLECYSTECTOMY      COLONOSCOPY      HAND SURGERY      x2 Right hand - lesion excisions    HYSTEROSCOPY W/ ENDOMETRIAL ABLATION       Social History     Socioeconomic History    Marital status: /Civil Union     Spouse name: None    Number of children: None    Years of education: None    Highest education level: None   Occupational History    Occupation: cardiology tech     Employer: [employer]   Social Needs    Financial resource strain: None    Food insecurity:     Worry: None     Inability: None    Transportation needs:     Medical: None     Non-medical: None   Tobacco Use    Smoking status: Former Smoker     Last attempt to quit: 2013     Years since quittin 0    Smokeless tobacco: Never Used   Substance and Sexual Activity    Alcohol use: Yes     Frequency: 2-4 times a month     Comment: occasional    Drug use: No    Sexual activity: None   Lifestyle    Physical activity:     Days per week: None     Minutes per session: None    Stress: None   Relationships  Social connections:     Talks on phone: None     Gets together: None     Attends Hindu service: None     Active member of club or organization: None     Attends meetings of clubs or organizations: None     Relationship status: None    Intimate partner violence:     Fear of current or ex partner: None     Emotionally abused: None     Physically abused: None     Forced sexual activity: None   Other Topics Concern    None   Social History Narrative    Daily caffeine consumption     Family History   Problem Relation Age of Onset    COPD Mother         severe    Alzheimer's disease Mother     Multiple sclerosis Mother     Depression Mother     Diabetes Father         DM    Heart disease Father         cardiac disorder     Hypertension Father     Kidney disease Maternal Grandfather     Diabetes Paternal Grandmother         DM    Thyroid disease Paternal Grandmother     Breast cancer Paternal Grandmother     Diabetes Paternal Grandfather         DM    Heart disease Paternal Grandfather         cardiac disorder     Diabetes Sister         DM    Thyroid disease Sister     Heart disease Maternal Grandmother         cardiac disorder     Hiatal hernia Maternal Grandmother     Brain cancer Son     Lymphoma Son     Kidney disease Family         renal disorder            PHYSICAL EXAM:    Vitals:    07/03/19 0814   BP: 102/78   Pulse: (!) 115     General Appearance:   Alert and oriented x 3   Cooperative, and in no respiratory distress   HEENT:   Normocephalic, atraumatic, anicteric      Neck:  Supple, symmetrical, trachea midline   Lungs:   Clear to auscultation bilaterally    Heart[de-identified]   Regular rate and rhythm   Abdomen:   Soft, non-tender, non-distended; normal bowel sounds; no masses, no organomegaly    Genitalia:   Deferred    Rectal:   Deferred    Extremities:  No cyanosis, clubbing or edema    Pulses:  2+ and symmetric all extremities    Skin:  Skin color, texture, turgor normal, no rashes or lesions    Lymph nodes:  No palpable cervical or supraclavicular lymphadenopathy          ASSESSMENT and PLAN:      1) Chronic GERD - Overall, the patient has had improvement with PPI dosing twice daily at 1st   She has now wean down to once daily, and has been doing well  We discussed the chronic risks of PPI use  - Will attempt to wean patient down to 20 mg Protonix daily  - If she is able to tolerate this without breakthrough symptoms, we may consider weaning the patient off of PPI completely as she is interested  - Right upper quadrant ultrasound and GES if symptoms were to return     2) Globus sensation - Likely secondary to above  She reports stress exacerbates her symptoms  Offered barium swallow to screen for esophageal motility disorder, but patient would prefer to defer at this time  Follow up PRN

## 2019-08-21 ENCOUNTER — TELEPHONE (OUTPATIENT)
Dept: ENDOCRINOLOGY | Facility: CLINIC | Age: 54
End: 2019-08-21

## 2019-08-21 ENCOUNTER — LAB (OUTPATIENT)
Dept: LAB | Facility: HOSPITAL | Age: 54
End: 2019-08-21
Attending: INTERNAL MEDICINE
Payer: COMMERCIAL

## 2019-08-21 DIAGNOSIS — E55.9 VITAMIN D DEFICIENCY: ICD-10-CM

## 2019-08-21 DIAGNOSIS — R73.03 PREDIABETES: ICD-10-CM

## 2019-08-21 DIAGNOSIS — E66.9 OBESITY (BMI 30-39.9): ICD-10-CM

## 2019-08-21 LAB
25(OH)D3 SERPL-MCNC: 45.2 NG/ML (ref 30–100)
ALBUMIN SERPL BCP-MCNC: 3.9 G/DL (ref 3.5–5)
ALP SERPL-CCNC: 75 U/L (ref 46–116)
ALT SERPL W P-5'-P-CCNC: 19 U/L (ref 12–78)
ANION GAP SERPL CALCULATED.3IONS-SCNC: 10 MMOL/L (ref 4–13)
AST SERPL W P-5'-P-CCNC: 10 U/L (ref 5–45)
BILIRUB SERPL-MCNC: 1 MG/DL (ref 0.2–1)
BUN SERPL-MCNC: 13 MG/DL (ref 5–25)
CALCIUM SERPL-MCNC: 9.5 MG/DL (ref 8.3–10.1)
CHLORIDE SERPL-SCNC: 105 MMOL/L (ref 100–108)
CHOLEST SERPL-MCNC: 198 MG/DL (ref 50–200)
CO2 SERPL-SCNC: 26 MMOL/L (ref 21–32)
CREAT SERPL-MCNC: 0.91 MG/DL (ref 0.6–1.3)
EST. AVERAGE GLUCOSE BLD GHB EST-MCNC: 105 MG/DL
GFR SERPL CREATININE-BSD FRML MDRD: 71 ML/MIN/1.73SQ M
GLUCOSE P FAST SERPL-MCNC: 80 MG/DL (ref 65–99)
HBA1C MFR BLD: 5.3 % (ref 4.2–6.3)
HDLC SERPL-MCNC: 48 MG/DL (ref 40–60)
LDLC SERPL CALC-MCNC: 120 MG/DL (ref 0–100)
NONHDLC SERPL-MCNC: 150 MG/DL
POTASSIUM SERPL-SCNC: 4 MMOL/L (ref 3.5–5.3)
PROT SERPL-MCNC: 7.3 G/DL (ref 6.4–8.2)
SODIUM SERPL-SCNC: 141 MMOL/L (ref 136–145)
T4 FREE SERPL-MCNC: 0.89 NG/DL (ref 0.76–1.46)
TRIGL SERPL-MCNC: 149 MG/DL
TSH SERPL DL<=0.05 MIU/L-ACNC: 3.11 UIU/ML (ref 0.36–3.74)

## 2019-08-21 PROCEDURE — 36415 COLL VENOUS BLD VENIPUNCTURE: CPT

## 2019-08-21 PROCEDURE — 84439 ASSAY OF FREE THYROXINE: CPT

## 2019-08-21 PROCEDURE — 83036 HEMOGLOBIN GLYCOSYLATED A1C: CPT

## 2019-08-21 PROCEDURE — 84443 ASSAY THYROID STIM HORMONE: CPT

## 2019-08-21 PROCEDURE — 80061 LIPID PANEL: CPT

## 2019-08-21 PROCEDURE — 80053 COMPREHEN METABOLIC PANEL: CPT

## 2019-08-21 PROCEDURE — 82306 VITAMIN D 25 HYDROXY: CPT

## 2019-08-21 NOTE — TELEPHONE ENCOUNTER
----- Message from Reymundo Coffey MD sent at 8/21/2019  1:56 PM EDT -----  Please call the patient regarding labs - labs look good-continue current meds

## 2019-08-26 ENCOUNTER — TELEPHONE (OUTPATIENT)
Dept: GASTROENTEROLOGY | Facility: CLINIC | Age: 54
End: 2019-08-26

## 2019-08-26 DIAGNOSIS — K21.9 CHRONIC GERD: Primary | ICD-10-CM

## 2019-08-26 DIAGNOSIS — R11.0 NAUSEA: ICD-10-CM

## 2019-08-26 NOTE — TELEPHONE ENCOUNTER
----- Message from Dominique Cano sent at 8/26/2019  8:48 AM EDT -----  Regarding: Pre-Op/Post-Op Question  Contact: [phone]  Tu Cho,  I have a feeling it's time to regroup and re-address my issues  I haven't been sleeping because I'm up most nights with burning in my throat  Then I end up swallowing most of the (phlegm) acid and by morning I am nauseated and wished I would have vomited instead  I'm not eating after 7pm now  I keep pushing it back thinking that I'm not giving myself enough time to digest my food  I usually go to bed around 10pm  Isn't 3 hrs good enough? I'm wondering if we couldn't make an appointment (soon) to discuss all of this  I know that I am under A LOT of stress lately and that could be part of this  I'm just trying to advocate for myself  There is something wrong with me and I have to keep going til I find out what it is  My recent labs are all good  I'm just concerned about my health  I didn't know if you rounded this week in the hospital so I sent this email     Thanks,  Eloy Gifford

## 2019-09-05 ENCOUNTER — TELEPHONE (OUTPATIENT)
Dept: INTERNAL MEDICINE CLINIC | Facility: CLINIC | Age: 54
End: 2019-09-05

## 2019-09-05 DIAGNOSIS — Z12.39 BREAST CANCER SCREENING: Primary | ICD-10-CM

## 2019-09-05 NOTE — TELEPHONE ENCOUNTER
Called pt to remind her she is still due for her mammogram  Pt asked if I could put a order in the system and mail it to her new home address [address]  Pt said she will schedule her appointment some time next week

## 2019-09-09 ENCOUNTER — HOSPITAL ENCOUNTER (OUTPATIENT)
Dept: NUCLEAR MEDICINE | Facility: HOSPITAL | Age: 54
Discharge: HOME | End: 2019-09-09
Payer: COMMERCIAL

## 2019-09-09 DIAGNOSIS — K21.9 CHRONIC GERD: ICD-10-CM

## 2019-09-09 DIAGNOSIS — R11.0 NAUSEA: ICD-10-CM

## 2019-09-09 PROCEDURE — A9541 TC99M SULFUR COLLOID: HCPCS

## 2019-09-09 PROCEDURE — 78264 GASTRIC EMPTYING IMG STUDY: CPT

## 2019-09-11 ENCOUNTER — TELEPHONE (OUTPATIENT)
Dept: GASTROENTEROLOGY | Facility: CLINIC | Age: 54
End: 2019-09-11

## 2019-09-11 NOTE — TELEPHONE ENCOUNTER
----- Message from Jessy Kellogg PA-C sent at 9/11/2019  8:42 AM EDT -----  Please let patient know that she had a very slight delay in emptying at the 3 hour alex during the test  She should be eating small, frequent meals avoiding fatty meat  Avoid large amounts of raw fruits and vegetables at a time

## 2019-09-19 ENCOUNTER — OFFICE VISIT (OUTPATIENT)
Dept: ENDOCRINOLOGY | Facility: CLINIC | Age: 54
End: 2019-09-19
Payer: COMMERCIAL

## 2019-09-19 VITALS
HEART RATE: 76 BPM | HEIGHT: 64 IN | WEIGHT: 201.4 LBS | BODY MASS INDEX: 34.38 KG/M2 | SYSTOLIC BLOOD PRESSURE: 110 MMHG | DIASTOLIC BLOOD PRESSURE: 80 MMHG

## 2019-09-19 DIAGNOSIS — E28.8 HYPERANDROGENISM: ICD-10-CM

## 2019-09-19 DIAGNOSIS — E66.9 OBESITY (BMI 30-39.9): Primary | ICD-10-CM

## 2019-09-19 DIAGNOSIS — E55.9 VITAMIN D DEFICIENCY: ICD-10-CM

## 2019-09-19 DIAGNOSIS — R73.03 PREDIABETES: ICD-10-CM

## 2019-09-19 PROCEDURE — 99214 OFFICE O/P EST MOD 30 MIN: CPT | Performed by: INTERNAL MEDICINE

## 2019-09-19 NOTE — LETTER
September 20, 2019     Mary Lynn MD  [address] [place] [address]    Patient: Jasmin Recinos   YOB: 1965   Date of Visit: 9/19/2019       Dear Dr Isael Wilson:    Thank you for referring Jasmin Recinos to me for evaluation  Below are my notes for this consultation  If you have questions, please do not hesitate to call me  I look forward to following your patient along with you  Sincerely,        Dallin Hernández MD        CC: MEGHANN Martinez [place],   9/20/2019 11:20 AM  Attested  Endocrinology Office Visit  ASSESSMENT/PLAN:  Plan:  Obesity  Weight has been stable, 201lbs in office today  Pt reports measurement of 196lbs at home  Pt is not interested in seeing nutritionist at this time  Continue Saxenda  Regarding patient's GERD/gastric emptying study, recommended followup with GI regarding findings/likelihood they are related to [place]  Check TSH, T4  Pt had previously reported she had been treated with levothyroxine for three months about 20 years ago  Prediabetes  Improved, last Hemoglobin A1c of 5 3, recheck in 5 months prior to next appt  Pt on [place] for obesity since 2017  Vitamin D deficiency  Improved to 45 8/2019,   recommended that pt can continue 5000U daily supplementation  Hyperandrogenism  Continue spironolactone as pt is having improvement in hirsuitism  Recheck DHEA-S, last levels were checked in 2017, was 374  Follow-up: In 5 months with AP    CHIEF COMPLAINT: weight concerns      HISTORY OF PRESENT ILLNESS:  54 yo F with history of prediabetes, hyperandrogenism, presenting for 6 month followup of chronic medical conditions including elevated weight  Has been on Saxenda since 2017 which has been helping with appetite suppression  Pt has lost approximately 20 lbs in 2 years  Pt measured weight on 9/14/19 and weighed 196 5  Pt walks around 1x-2x a day around trail at work  Increased activity with home renovations  Pt watches what she eats and tries to make good choices  Pt has been avoiding fresh vegetables, states she was advised to not eat these because of gastric emptying study being positive, but will still eat some salads in the morning  Pt states average breakfast - 2 eggs, with some cheese/onion/mushroom, sometimes cup of fruit instead  Average lunch - small salad OR cup of soup  Average dinner -  usually cooks - usually a protein, potato (sometimes air fried), and some other vegetable  Infrequent sweets - dessert less than once a week  Pt avoids soda (last soda 2 weeks ago) -  has juice a couple of times a month, unsweetened ice tea with dinner  Pt reports spironolactone has improved hirsutism, has softened facial hair but she still has to pluck hair on her chin and on her upper lip  Pt had vitamin D deficiency 8 8 in 2017  Pt is not interested in seeing a nutritionist; thinks her mind is not there yet  Pt reports that her attention is currently on her house and home life - brother recently moved in to house and pt will be moving into a new house next weekend; pt is currently working on renovations at this house  Review of Systems   Constitutional: Negative for chills and fever  Respiratory: Negative for shortness of breath and wheezing  Cardiovascular: Negative for chest pain, palpitations and leg swelling  Gastrointestinal: Negative for constipation, diarrhea and vomiting  Reports burning sensation across bilateral anterior chest associated with acid reflux   Genitourinary: Negative for dysuria and hematuria  Neurological: Negative for dizziness, light-headedness and headaches  OBJECTIVE:  Vitals:    09/19/19 0913   BP: 110/80   Pulse: 76   Height: 5' 4" (1 626 m)       Physical Exam   Constitutional: She is oriented to person, place, and time  She appears well-developed and well-nourished  HENT:   Head: Normocephalic and atraumatic     Nose: Nose normal    Mouth/Throat: Oropharynx is clear and moist    Eyes: Right eye exhibits no discharge  Left eye exhibits no discharge  Cardiovascular: Normal rate, regular rhythm and normal heart sounds  Exam reveals no gallop and no friction rub  No murmur heard  Pulmonary/Chest: Effort normal and breath sounds normal  No respiratory distress  She has no wheezes  She has no rales  Abdominal: Soft  She exhibits no distension  There is no tenderness  There is no guarding  Musculoskeletal: She exhibits no edema  Neurological: She is alert and oriented to person, place, and time  Skin: Skin is warm and dry  Psychiatric: She has a normal mood and affect   Her speech is normal and behavior is normal          Current Outpatient Medications:     Cholecalciferol (VITAMIN D3) 5000 units CAPS, Take 1 capsule by mouth daily, Disp: , Rfl:     famotidine (PEPCID) 20 mg tablet, Take 20 mg by mouth daily , Disp: , Rfl:     Insulin Pen Needle (BD PEN NEEDLE JOEL U/F) 32G X 4 MM MISC, by Does not apply route daily for 100 days 1 per day, Disp: 100 each, Rfl: 1    liraglutide (SAXENDA) injection, Inject 0 5 mL (3 mg total) under the skin daily for 30 days, Disp: 15 mL, Rfl: 5    pantoprazole (PROTONIX) 40 mg tablet, Take 1 tablet (40 mg total) by mouth daily 30 minutes before breakfast, then 30 minutes before dinner, Disp: 30 tablet, Rfl: 2    spironolactone (ALDACTONE) 50 mg tablet, Take 1 tablet (50 mg total) by mouth 2 (two) times a day, Disp: 180 tablet, Rfl: 1    Past Medical History:   Diagnosis Date    Claustrophobia     Hirsutism     Hyperandrogenism     Hyperglycemia     Hypothyroidism     last assessed 2/10/2017    Memory difficulties     Migraine headache     Premenopausal menorrhagia 01/10/2006    SAB (spontaneous )     Vitamin D deficiency     Word finding difficulty      Past Surgical History:   Procedure Laterality Date    CHOLECYSTECTOMY      COLONOSCOPY      HAND SURGERY      x2 Right hand - lesion excisions    HYSTEROSCOPY W/ ENDOMETRIAL ABLATION       Social History     Socioeconomic History    Marital status: /Civil Union     Spouse name: Not on file    Number of children: Not on file    Years of education: Not on file    Highest education level: Not on file   Occupational History    Occupation: cardiology tech     Employer: [employer]   Social Needs    Financial resource strain: Not on file    Food insecurity:     Worry: Not on file     Inability: Not on file    Transportation needs:     Medical: Not on file     Non-medical: Not on file   Tobacco Use    Smoking status: Former Smoker     Last attempt to quit: 2013     Years since quittin 2    Smokeless tobacco: Never Used   Substance and Sexual Activity    Alcohol use: Yes     Frequency: 2-4 times a month     Comment: occasional    Drug use: No    Sexual activity: Not on file   Lifestyle    Physical activity:     Days per week: Not on file     Minutes per session: Not on file    Stress: Not on file   Relationships    Social connections:     Talks on phone: Not on file     Gets together: Not on file     Attends Taoism service: Not on file     Active member of club or organization: Not on file     Attends meetings of clubs or organizations: Not on file     Relationship status: Not on file    Intimate partner violence:     Fear of current or ex partner: Not on file     Emotionally abused: Not on file     Physically abused: Not on file     Forced sexual activity: Not on file   Other Topics Concern    Not on file   Social History Narrative    Daily caffeine consumption     Family History   Problem Relation Age of Onset    COPD Mother         severe    Alzheimer's disease Mother     Multiple sclerosis Mother     Depression Mother     Diabetes Father         DM    Heart disease Father         cardiac disorder     Hypertension Father     Kidney disease Maternal Grandfather     Diabetes Paternal Grandmother DM    Thyroid disease Paternal Grandmother     Breast cancer Paternal Grandmother     Diabetes Paternal Grandfather         DM    Heart disease Paternal Grandfather         cardiac disorder     Diabetes Sister         DM    Thyroid disease Sister     Heart disease Maternal Grandmother         cardiac disorder     Hiatal hernia Maternal Grandmother     Brain cancer Son     Lymphoma Son     Kidney disease Family         renal disorder       Юлия Choudhury DO  [facility] Internal Medicine PGY-2  Attestation signed by Claudia Burch MD at 9/20/2019 11:20 AM:  I reviewed the care furnished by the Resident during the visit  I was present in the office and personally saw and examined the patient   Agree with the plan except as noted  She is on a GLP-1 agonist which can cause delayed gastric emptying-she currently reports no symptom of nausea, fullness, vomiting except for a so will continue saxenda    I have advised her to discussed with the gastroenterologist as well    Claudia Burch MD 09/20/19

## 2019-09-19 NOTE — PROGRESS NOTES
Endocrinology Office Visit  ASSESSMENT/PLAN:  Plan:  Obesity  Weight has been stable, 201lbs in office today  Pt reports measurement of 196lbs at home  Pt is not interested in seeing nutritionist at this time  Continue Saxenda  Regarding patient's GERD/gastric emptying study, recommended followup with GI regarding findings/likelihood they are related to [place]  Check TSH, T4  Pt had previously reported she had been treated with levothyroxine for three months about 20 years ago  Prediabetes  Improved, last Hemoglobin A1c of 5 3, recheck in 5 months prior to next appt  Pt on [place] for obesity since 2017  Vitamin D deficiency  Improved to 45 8/2019,   recommended that pt can continue 5000U daily supplementation  Hyperandrogenism  Continue spironolactone as pt is having improvement in hirsuitism  Recheck DHEA-S, last levels were checked in 2017, was 374  Follow-up: In 5 months with AP    CHIEF COMPLAINT: weight concerns      HISTORY OF PRESENT ILLNESS:  52 yo F with history of prediabetes, hyperandrogenism, presenting for 6 month followup of chronic medical conditions including elevated weight  Has been on Saxenda since 2017 which has been helping with appetite suppression  Pt has lost approximately 20 lbs in 2 years  Pt measured weight on 9/14/19 and weighed 196 5  Pt walks around 1x-2x a day around trail at work  Increased activity with home renovations  Pt watches what she eats and tries to make good choices  Pt has been avoiding fresh vegetables, states she was advised to not eat these because of gastric emptying study being positive, but will still eat some salads in the morning  Pt states average breakfast - 2 eggs, with some cheese/onion/mushroom, sometimes cup of fruit instead  Average lunch - small salad OR cup of soup  Average dinner -  usually cooks - usually a protein, potato (sometimes air fried), and some other vegetable    Infrequent sweets - dessert less than once a week  Pt avoids soda (last soda 2 weeks ago) -  has juice a couple of times a month, unsweetened ice tea with dinner  Pt reports spironolactone has improved hirsutism, has softened facial hair but she still has to pluck hair on her chin and on her upper lip  Pt had vitamin D deficiency 8 8 in 2017  Pt is not interested in seeing a nutritionist; thinks her mind is not there yet  Pt reports that her attention is currently on her house and home life - brother recently moved in to house and pt will be moving into a new house next weekend; pt is currently working on renovations at this house  Review of Systems   Constitutional: Negative for chills and fever  Respiratory: Negative for shortness of breath and wheezing  Cardiovascular: Negative for chest pain, palpitations and leg swelling  Gastrointestinal: Negative for constipation, diarrhea and vomiting  Reports burning sensation across bilateral anterior chest associated with acid reflux   Genitourinary: Negative for dysuria and hematuria  Neurological: Negative for dizziness, light-headedness and headaches  OBJECTIVE:  Vitals:    09/19/19 0913   BP: 110/80   Pulse: 76   Height: 5' 4" (1 626 m)       Physical Exam   Constitutional: She is oriented to person, place, and time  She appears well-developed and well-nourished  HENT:   Head: Normocephalic and atraumatic  Nose: Nose normal    Mouth/Throat: Oropharynx is clear and moist    Eyes: Right eye exhibits no discharge  Left eye exhibits no discharge  Cardiovascular: Normal rate, regular rhythm and normal heart sounds  Exam reveals no gallop and no friction rub  No murmur heard  Pulmonary/Chest: Effort normal and breath sounds normal  No respiratory distress  She has no wheezes  She has no rales  Abdominal: Soft  She exhibits no distension  There is no tenderness  There is no guarding  Musculoskeletal: She exhibits no edema     Neurological: She is alert and oriented to person, place, and time  Skin: Skin is warm and dry  Psychiatric: She has a normal mood and affect   Her speech is normal and behavior is normal          Current Outpatient Medications:     Cholecalciferol (VITAMIN D3) 5000 units CAPS, Take 1 capsule by mouth daily, Disp: , Rfl:     famotidine (PEPCID) 20 mg tablet, Take 20 mg by mouth daily , Disp: , Rfl:     Insulin Pen Needle (BD PEN NEEDLE JOEL U/F) 32G X 4 MM MISC, by Does not apply route daily for 100 days 1 per day, Disp: 100 each, Rfl: 1    liraglutide (SAXENDA) injection, Inject 0 5 mL (3 mg total) under the skin daily for 30 days, Disp: 15 mL, Rfl: 5    pantoprazole (PROTONIX) 40 mg tablet, Take 1 tablet (40 mg total) by mouth daily 30 minutes before breakfast, then 30 minutes before dinner, Disp: 30 tablet, Rfl: 2    spironolactone (ALDACTONE) 50 mg tablet, Take 1 tablet (50 mg total) by mouth 2 (two) times a day, Disp: 180 tablet, Rfl: 1    Past Medical History:   Diagnosis Date    Claustrophobia     Hirsutism     Hyperandrogenism     Hyperglycemia     Hypothyroidism     last assessed 2/10/2017    Memory difficulties     Migraine headache     Premenopausal menorrhagia 01/10/2006    SAB (spontaneous )     Vitamin D deficiency     Word finding difficulty      Past Surgical History:   Procedure Laterality Date    CHOLECYSTECTOMY      COLONOSCOPY      HAND SURGERY      x2 Right hand - lesion excisions    HYSTEROSCOPY W/ ENDOMETRIAL ABLATION       Social History     Socioeconomic History    Marital status: /Civil Union     Spouse name: Not on file    Number of children: Not on file    Years of education: Not on file    Highest education level: Not on file   Occupational History    Occupation: cardiology tech     Employer: [employer]   Social Needs    Financial resource strain: Not on file    Food insecurity:     Worry: Not on file     Inability: Not on file    Transportation needs:     Medical: Not on file     Non-medical: Not on file   Tobacco Use    Smoking status: Former Smoker     Last attempt to quit: 2013     Years since quittin 2    Smokeless tobacco: Never Used   Substance and Sexual Activity    Alcohol use: Yes     Frequency: 2-4 times a month     Comment: occasional    Drug use: No    Sexual activity: Not on file   Lifestyle    Physical activity:     Days per week: Not on file     Minutes per session: Not on file    Stress: Not on file   Relationships    Social connections:     Talks on phone: Not on file     Gets together: Not on file     Attends Confucianism service: Not on file     Active member of club or organization: Not on file     Attends meetings of clubs or organizations: Not on file     Relationship status: Not on file    Intimate partner violence:     Fear of current or ex partner: Not on file     Emotionally abused: Not on file     Physically abused: Not on file     Forced sexual activity: Not on file   Other Topics Concern    Not on file   Social History Narrative    Daily caffeine consumption     Family History   Problem Relation Age of Onset    COPD Mother         severe    Alzheimer's disease Mother     Multiple sclerosis Mother     Depression Mother     Diabetes Father         DM    Heart disease Father         cardiac disorder     Hypertension Father     Kidney disease Maternal Grandfather     Diabetes Paternal Grandmother         DM    Thyroid disease Paternal Grandmother     Breast cancer Paternal Grandmother     Diabetes Paternal Grandfather         DM    Heart disease Paternal Grandfather         cardiac disorder     Diabetes Sister         DM    Thyroid disease Sister     Heart disease Maternal Grandmother         cardiac disorder     Hiatal hernia Maternal Grandmother     Brain cancer Son     Lymphoma Son     Kidney disease Family         renal disorder       DO Kristin Mauricio  [facility] Internal Medicine PGY-2

## 2019-10-24 DIAGNOSIS — E66.9 LIFELONG OBESITY: ICD-10-CM

## 2019-10-24 RX ORDER — LIRAGLUTIDE 6 MG/ML
INJECTION, SOLUTION SUBCUTANEOUS
Qty: 15 ML | Refills: 0 | Status: SHIPPED | OUTPATIENT
Start: 2019-10-24 | End: 2019-12-17 | Stop reason: SDUPTHER

## 2019-10-29 ENCOUNTER — OFFICE VISIT (OUTPATIENT)
Dept: INTERNAL MEDICINE CLINIC | Facility: CLINIC | Age: 54
End: 2019-10-29
Payer: COMMERCIAL

## 2019-10-29 VITALS
DIASTOLIC BLOOD PRESSURE: 70 MMHG | WEIGHT: 195.8 LBS | HEART RATE: 86 BPM | OXYGEN SATURATION: 96 % | HEIGHT: 64 IN | BODY MASS INDEX: 33.43 KG/M2 | TEMPERATURE: 99.5 F | SYSTOLIC BLOOD PRESSURE: 108 MMHG

## 2019-10-29 DIAGNOSIS — K52.9 GASTROENTERITIS: Primary | ICD-10-CM

## 2019-10-29 PROBLEM — E78.5 HYPERLIPIDEMIA: Status: RESOLVED | Noted: 2017-08-23 | Resolved: 2019-10-29

## 2019-10-29 PROBLEM — F32.A DEPRESSION: Status: RESOLVED | Noted: 2017-02-10 | Resolved: 2019-10-29

## 2019-10-29 PROCEDURE — 99213 OFFICE O/P EST LOW 20 MIN: CPT | Performed by: INTERNAL MEDICINE

## 2019-10-29 PROCEDURE — 3008F BODY MASS INDEX DOCD: CPT | Performed by: INTERNAL MEDICINE

## 2019-10-29 RX ORDER — ONDANSETRON 4 MG/1
4 TABLET, FILM COATED ORAL EVERY 8 HOURS PRN
Qty: 20 TABLET | Refills: 0 | Status: SHIPPED | OUTPATIENT
Start: 2019-10-29

## 2019-10-29 NOTE — LETTER
October 29, 2019     Patient: Merline Massing   YOB: 1965   Date of Visit: 10/29/2019       To Whom it May Concern:    Merline Massing is under my professional care  She was seen in my office on 10/29/2019  She may return to work on 10/31/19  If you have any questions or concerns, please don't hesitate to call           Sincerely,          Krishna Salazar MD        CC: Merline Massing

## 2019-10-29 NOTE — PROGRESS NOTES
INTERNAL MEDICINE FOLLOW-UP OFFICE VISIT  [facility] of BEHAVIORAL MEDICINE AT [facility]    NAME: Anita Soria  AGE: 53 y o  SEX: female  : 1965   MRN: [id number]    DATE: 10/29/2019  TIME: 4:35 PM    Assessment and Plan     Diagnoses and all orders for this visit:    Gastroenteritis  -     ondansetron (ZOFRAN) 4 mg tablet; Take 1 tablet (4 mg total) by mouth every 8 (eight) hours as needed for nausea or vomiting  She was told to take Kaopectate for the diarrhea  She was also told to  drink a lot of fluids to prevent dehydration  Other orders  -     Cancel: Ambulatory referral to Gastroenterology; Future        - Counseling Documentation: patient was counseled regarding: instructions for management, risk factor reductions, prognosis, patient and family education, risks and benefits of treatment options and importance of compliance with treatment  - Medication Side Effects: Adverse side effects of medications were reviewed with the patient/guardian today  Return to office in: as needed    Chief Complaint     Chief Complaint   Patient presents with    GI Problem    Nausea       History of Present Illness     Diarrhea    This is a new problem  The current episode started in the past 7 days  The problem occurs 5 to 10 times per day  The problem has been unchanged  The stool consistency is described as watery  Pertinent negatives include no abdominal pain, arthralgias, chills, coughing, fever, headaches, myalgias or vomiting  Nothing aggravates the symptoms  There are no known risk factors  She has tried nothing for the symptoms  The following portions of the patient's history were reviewed and updated as appropriate: allergies, current medications, past family history, past medical history, past social history, past surgical history and problem list     Review of Systems     Review of Systems   Constitutional: Negative for chills, diaphoresis, fatigue and fever     HENT: Negative for congestion, ear discharge, ear pain, hearing loss, postnasal drip, rhinorrhea, sinus pressure, sinus pain, sneezing, sore throat and voice change  Eyes: Negative for pain, discharge, redness and visual disturbance  Respiratory: Negative for cough, chest tightness, shortness of breath and wheezing  Cardiovascular: Negative for chest pain, palpitations and leg swelling  Gastrointestinal: Positive for diarrhea and nausea  Negative for abdominal distention, abdominal pain, blood in stool, constipation and vomiting  Endocrine: Negative for cold intolerance, heat intolerance, polydipsia, polyphagia and polyuria  Genitourinary: Negative for dysuria, flank pain, frequency, hematuria and urgency  Musculoskeletal: Negative for arthralgias, back pain, gait problem, joint swelling, myalgias, neck pain and neck stiffness  Skin: Negative for rash  Neurological: Negative for dizziness, tremors, syncope, facial asymmetry, speech difficulty, weakness, light-headedness, numbness and headaches  Hematological: Does not bruise/bleed easily  Psychiatric/Behavioral: Negative for behavioral problems, confusion and sleep disturbance  The patient is not nervous/anxious  Active Problem List     Patient Active Problem List   Diagnosis    Memory difficulties    Abnormal weight gain    Arthritis    Claustrophobia    GERD (gastroesophageal reflux disease)    Hirsutism    Hyperandrogenism    Hyperglycemia    Lump in neck    Migraine headache    Prediabetes    Vitamin D deficiency    Obesity (BMI 30-39  9)    Globus sensation       Objective     /70   Pulse 86   Temp 99 5 °F (37 5 °C)   Ht 5' 4" (1 626 m)   Wt 88 8 kg (195 lb 12 8 oz)   SpO2 96%   BMI 33 61 kg/m²     Physical Exam   Constitutional: She is oriented to person, place, and time  She appears well-developed  No distress  dehydrated   HENT:   Head: Normocephalic and atraumatic     Right Ear: External ear normal    Left Ear: External ear normal    Nose: Nose normal    Mouth/Throat: Oropharynx is clear and moist    Eyes: Conjunctivae and EOM are normal  Right eye exhibits no discharge  Left eye exhibits no discharge  No scleral icterus  Neck: Normal range of motion  Neck supple  No JVD present  No tracheal deviation present  No thyromegaly present  Cardiovascular: Normal rate, regular rhythm, normal heart sounds and intact distal pulses  Exam reveals no gallop and no friction rub  No murmur heard  Pulmonary/Chest: Effort normal and breath sounds normal  No respiratory distress  She has no wheezes  She has no rales  She exhibits no tenderness  Abdominal: Soft  Bowel sounds are normal  She exhibits no distension  There is no tenderness  There is no rebound and no guarding  Musculoskeletal: Normal range of motion  She exhibits no edema or tenderness  Lymphadenopathy:     She has no cervical adenopathy  Neurological: She is alert and oriented to person, place, and time  No cranial nerve deficit  She exhibits normal muscle tone  Coordination normal    Skin: Skin is warm and dry  No rash noted  She is not diaphoretic  No erythema  Psychiatric: She has a normal mood and affect   Judgment normal            Current Medications       Current Outpatient Medications:     Cholecalciferol (VITAMIN D3) 5000 units CAPS, Take 1 capsule by mouth daily, Disp: , Rfl:     famotidine (PEPCID) 20 mg tablet, Take 20 mg by mouth daily , Disp: , Rfl:     pantoprazole (PROTONIX) 40 mg tablet, Take 1 tablet (40 mg total) by mouth daily 30 minutes before breakfast, then 30 minutes before dinner, Disp: 30 tablet, Rfl: 2    SAXENDA injection, INJECT 0 5ML (3MG) SUBCUTANEOUSLY EVERY DAY FOR 30 DAYS, Disp: 15 mL, Rfl: 0    spironolactone (ALDACTONE) 50 mg tablet, Take 1 tablet (50 mg total) by mouth 2 (two) times a day, Disp: 180 tablet, Rfl: 1    Insulin Pen Needle (BD PEN NEEDLE JOEL U/F) 32G X 4 MM MISC, by Does not apply route daily for 100 days 1 per day, Disp: 100 each, Rfl: 1    ondansetron (ZOFRAN) 4 mg tablet, Take 1 tablet (4 mg total) by mouth every 8 (eight) hours as needed for nausea or vomiting, Disp: 20 tablet, Rfl: 0    Health Maintenance     Health Maintenance   Topic Date Due    Hepatitis C Screening  1965    CRC Screening: Colonoscopy  1965    BMI: Followup Plan  02/10/1982    DTaP,Tdap,and Td Vaccines (1 - Tdap) 02/10/1985    MAMMOGRAM  02/27/2018    INFLUENZA VACCINE  07/01/2019    BMI: Adult  10/29/2020    Cervical Cancer Screening  12/19/2020    Pneumococcal Vaccine: 65+ Years (1 of 2 - PCV13) 02/10/2029    Pneumococcal Vaccine: Pediatrics (0 to 5 Years) and At-Risk Patients (6 to 59 Years)  Aged Out    HEPATITIS B VACCINES  Aged [employer] History   Administered Date(s) Administered    INFLUENZA 11/05/2018         Christine Johnson MD  [id number] [facility] of BEHAVIORAL MEDICINE AT [facility]

## 2019-10-30 ENCOUNTER — TELEPHONE (OUTPATIENT)
Dept: INTERNAL MEDICINE CLINIC | Facility: CLINIC | Age: 54
End: 2019-10-30

## 2019-10-30 NOTE — TELEPHONE ENCOUNTER
Patient's called: she's was in to see Dr Андрей Lock- she's been taking rx's as prescribed and has been drinking Gatorade as told  Still  nauseous still has diherrea  Has mild abdominal pain  No throwing up  Work note said she should go back to work tomorrow- she's not sure what Dr Андрей Lock thinks- Should she come back in to be evaluated again? Please advise       's cell [phone]

## 2019-10-30 NOTE — TELEPHONE ENCOUNTER
Pt informed of Dr Stephanie Bean message  Pt stated she will call back tomorrow if she is still not feeling well

## 2019-10-30 NOTE — TELEPHONE ENCOUNTER
Continue the Zofran and the Kaopectate and continue taking fluids as much as possible    We can extend the note if she is not feeling better

## 2019-10-31 NOTE — TELEPHONE ENCOUNTER
Pt called back, wants to know if we could extend work note until Monday   Her  will be in tomorrow to      Any questions call pt   [phone]

## 2019-11-07 ENCOUNTER — TELEPHONE (OUTPATIENT)
Dept: GASTROENTEROLOGY | Facility: CLINIC | Age: 54
End: 2019-11-07

## 2019-11-07 NOTE — TELEPHONE ENCOUNTER
Please let her know that I think she should follow up in the office  It sounds like she would benefit from a repeat colonoscopy if she has had a change in bowel habits  I know that she had a colonoscopy last a [facility] about 5 years ago  Thank you!

## 2019-11-07 NOTE — TELEPHONE ENCOUNTER
----- Message from Memory Smoke sent at 11/7/2019 12:37 PM EST -----  Regarding: Non-Urgent Medical Question  Contact: [phone]  Please check my chart  I was seen and excused from work by Dr Cheng Chanel  I am having increasing concerns with my digestive health  In particular, my focus is on my bowel movements  They literally went from water to hard (constipation-like)  I'm wondering if I am showing signs of IBS  I'm paying attention to my eating, drinking and bowel habits to see if there is a common denominator  Other than that, I would be glad to hear what you think  I've had to apply for Intermittent FMLA because my GI tract is such an issue right now  I'm just trying to be well  Please help  Thank you!

## 2019-11-20 ENCOUNTER — OFFICE VISIT (OUTPATIENT)
Dept: GASTROENTEROLOGY | Facility: CLINIC | Age: 54
End: 2019-11-20
Payer: COMMERCIAL

## 2019-11-20 VITALS
SYSTOLIC BLOOD PRESSURE: 110 MMHG | DIASTOLIC BLOOD PRESSURE: 70 MMHG | HEART RATE: 81 BPM | BODY MASS INDEX: 33.19 KG/M2 | WEIGHT: 194.4 LBS | HEIGHT: 64 IN

## 2019-11-20 DIAGNOSIS — R10.9 ABDOMINAL CRAMPING: Primary | ICD-10-CM

## 2019-11-20 DIAGNOSIS — Z86.010 HISTORY OF COLON POLYPS: ICD-10-CM

## 2019-11-20 DIAGNOSIS — K21.9 GASTROESOPHAGEAL REFLUX DISEASE, ESOPHAGITIS PRESENCE NOT SPECIFIED: ICD-10-CM

## 2019-11-20 DIAGNOSIS — R19.4 CHANGE IN BOWEL HABIT: ICD-10-CM

## 2019-11-20 PROBLEM — Z86.0100 HISTORY OF COLON POLYPS: Status: ACTIVE | Noted: 2019-11-20

## 2019-11-20 PROCEDURE — 99214 OFFICE O/P EST MOD 30 MIN: CPT | Performed by: PHYSICIAN ASSISTANT

## 2019-11-20 RX ORDER — FAMOTIDINE 40 MG/1
40 TABLET, FILM COATED ORAL 2 TIMES DAILY PRN
Qty: 60 TABLET | Refills: 2 | Status: SHIPPED | OUTPATIENT
Start: 2019-11-20 | End: 2020-11-24 | Stop reason: SDUPTHER

## 2019-11-20 RX ORDER — DICYCLOMINE HCL 20 MG
20 TABLET ORAL EVERY 6 HOURS
Qty: 60 TABLET | Refills: 2 | Status: SHIPPED | OUTPATIENT
Start: 2019-11-20 | End: 2020-02-12 | Stop reason: SDUPTHER

## 2019-11-20 NOTE — PROGRESS NOTES
[address] [facility]  Diya Fabby 53 y o  female MRN: [id number]       CC: Alternating diarrhea and constipation, lower abdominal pain    HPI:  Pina Hernandez is a 50-year-old female with history of hypothyroidism, type 2 diabetes, GERD, and colon polyps  Patient presents today for new change in bowel habits  She reports that on the week of Halloween, she had a week of diarrhea  However, since then she has been constipated which is not her usual bowel habit  She is also experiencing lower abdominal pain  Her nausea is occasional at this point, however was flared when she was experiencing diarrhea  She is no longer having GERD symptoms  Patient's last EGD was with doctor Wen Galarza in June  This was normal   Biopsies were negative for H pylori  We now have her records She had a colonoscopy with [facility] in 2013  She had a hyperplastic polyp removed  She is due for recall  Review of Systems:    CONSTITUTIONAL: Denies any fever, chills, or rigors  Good appetite, and no recent weight loss  HEENT: No earache or tinnitus  Denies hearing loss or visual disturbances  CARDIOVASCULAR: No chest pain or palpitations  RESPIRATORY: Denies any cough, hemoptysis, shortness of breath or dyspnea on exertion  GASTROINTESTINAL: As noted in the History of Present Illness  GENITOURINARY: No problems with urination  Denies any hematuria or dysuria  NEUROLOGIC: No dizziness or vertigo, denies headaches  MUSCULOSKELETAL: Denies any muscle or joint pain  SKIN: Denies skin rashes or itching  ENDOCRINE: Denies excessive thirst  Denies intolerance to heat or cold  PSYCHOSOCIAL: Denies depression or anxiety  Denies any recent memory loss         Current Outpatient Medications   Medication Sig Dispense Refill    Cholecalciferol (VITAMIN D3) 5000 units CAPS Take 1 capsule by mouth daily      Insulin Pen Needle (BD PEN NEEDLE JOEL U/F) 32G X 4 MM MISC by Does not apply route daily for 100 days 1 per day 100 each 1    ondansetron (ZOFRAN) 4 mg tablet Take 1 tablet (4 mg total) by mouth every 8 (eight) hours as needed for nausea or vomiting 20 tablet 0    pantoprazole (PROTONIX) 40 mg tablet Take 1 tablet (40 mg total) by mouth daily 30 minutes before breakfast, then 30 minutes before dinner 30 tablet 2    SAXENDA injection INJECT 0 5ML (3MG) SUBCUTANEOUSLY EVERY DAY FOR 30 DAYS 15 mL 0    spironolactone (ALDACTONE) 50 mg tablet Take 1 tablet (50 mg total) by mouth 2 (two) times a day 180 tablet 1    dicyclomine (BENTYL) 20 mg tablet Take 1 tablet (20 mg total) by mouth every 6 (six) hours 60 tablet 2    famotidine (PEPCID) 40 MG tablet Take 1 tablet (40 mg total) by mouth 2 (two) times a day as needed for heartburn 60 tablet 2     No current facility-administered medications for this visit        Past Medical History:   Diagnosis Date    Claustrophobia     Hirsutism     Hyperandrogenism     Hyperglycemia     Hypothyroidism     last assessed 2/10/2017    Memory difficulties     Migraine headache     Premenopausal menorrhagia 01/10/2006    SAB (spontaneous )     Vitamin D deficiency     Word finding difficulty      Past Surgical History:   Procedure Laterality Date    CHOLECYSTECTOMY      COLONOSCOPY      HAND SURGERY      x2 Right hand - lesion excisions    HYSTEROSCOPY W/ ENDOMETRIAL ABLATION       Social History     Socioeconomic History    Marital status: /Civil Union     Spouse name: None    Number of children: None    Years of education: None    Highest education level: None   Occupational History    Occupation: cardiology tech     Employer: [employer]   Social Needs    Financial resource strain: None    Food insecurity:     Worry: None     Inability: None    Transportation needs:     Medical: None     Non-medical: None   Tobacco Use    Smoking status: Former Smoker     Last attempt to quit: 2013     Years since quittin 4    Smokeless tobacco: Never Used Substance and Sexual Activity    Alcohol use: Yes     Frequency: 2-4 times a month     Comment: occasional    Drug use: No    Sexual activity: None   Lifestyle    Physical activity:     Days per week: 0 days     Minutes per session: 0 min    Stress: None   Relationships    Social connections:     Talks on phone: None     Gets together: None     Attends Jew service: None     Active member of club or organization: None     Attends meetings of clubs or organizations: None     Relationship status: None    Intimate partner violence:     Fear of current or ex partner: None     Emotionally abused: None     Physically abused: None     Forced sexual activity: None   Other Topics Concern    None   Social History Narrative    Daily caffeine consumption     Family History   Problem Relation Age of Onset    COPD Mother         severe    Alzheimer's disease Mother     Multiple sclerosis Mother     Depression Mother     Diabetes Father         DM    Heart disease Father         cardiac disorder     Hypertension Father     Kidney disease Maternal Grandfather     Diabetes Paternal Grandmother         DM    Thyroid disease Paternal Grandmother     Breast cancer Paternal Grandmother     Diabetes Paternal Grandfather         DM    Heart disease Paternal Grandfather         cardiac disorder     Diabetes Sister         DM    Thyroid disease Sister     Heart disease Maternal Grandmother         cardiac disorder     Hiatal hernia Maternal Grandmother     Brain cancer Son     Lymphoma Son     Kidney disease Family         renal disorder            PHYSICAL EXAM:    Vitals:    11/20/19 1523   BP: 110/70   Pulse: 81   Weight: 88 2 kg (194 lb 6 4 oz)   Height: 5' 4" (1 626 m)     General Appearance:   Alert and oriented x 3   Cooperative, and in no respiratory distress   HEENT:   Normocephalic, atraumatic, anicteric      Neck:  Supple, symmetrical, trachea midline   Lungs:   Clear to auscultation bilaterally  Heart[de-identified]   Regular rate and rhythm   Abdomen:   Soft, non-tender, non-distended; normal bowel sounds; no masses, no organomegaly    Genitalia:   Deferred    Rectal:   Deferred    Extremities:  No cyanosis, clubbing or edema    Pulses:  2+ and symmetric all extremities    Skin:  Skin color, texture, turgor normal, no rashes or lesions    Lymph nodes:  No palpable cervical or supraclavicular lymphadenopathy        Lab Results:       Results from last 6 Months   Lab Units 08/21/19  0735   POTASSIUM mmol/L 4 0   CHLORIDE mmol/L 105   CO2 mmol/L 26   BUN mg/dL 13   CREATININE mg/dL 0 91   CALCIUM mg/dL 9 5   ALK PHOS U/L 75   ALT U/L 19   AST U/L 10               Imaging Studies: I have personally reviewed pertinent imaging studies  Nm Gastric Emptying    Result Date: 9/9/2019  Impression: Delayed rate of gastric emptying  Workstation performed: [id number]       ASSESSMENT and PLAN:      1) Alternating bowel habits - This is a change for the patient  She is normally not constipated  She is also experiencing lower abdominal pain  To her knowledge, she does not have a family history of IBD  May be secondary to irritable bowel syndrome  However, she is due for a colonoscopy recall  - Colonoscopy to investigate  - Further recommendations based on above  - Bentyl 20 mg as needed    2) Mild delay in gastric emptying - Small frequent meals  Zofran as needed  3) History of colon polyps        Follow up after colonoscopy

## 2019-11-20 NOTE — H&P (VIEW-ONLY)
[address] [facility]  [employer] 53 y o  female MRN: [id number]       CC: Alternating diarrhea and constipation, lower abdominal pain    HPI:  Alyssa Espinosa is a 24-year-old female with history of hypothyroidism, type 2 diabetes, GERD, and colon polyps  Patient presents today for new change in bowel habits  She reports that on the week of Halloween, she had a week of diarrhea  However, since then she has been constipated which is not her usual bowel habit  She is also experiencing lower abdominal pain  Her nausea is occasional at this point, however was flared when she was experiencing diarrhea  She is no longer having GERD symptoms  Patient's last EGD was with doctor Arin Mejia in June  This was normal   Biopsies were negative for H pylori  We now have her records She had a colonoscopy with [facility] in 2013  She had a hyperplastic polyp removed  She is due for recall  Review of Systems:    CONSTITUTIONAL: Denies any fever, chills, or rigors  Good appetite, and no recent weight loss  HEENT: No earache or tinnitus  Denies hearing loss or visual disturbances  CARDIOVASCULAR: No chest pain or palpitations  RESPIRATORY: Denies any cough, hemoptysis, shortness of breath or dyspnea on exertion  GASTROINTESTINAL: As noted in the History of Present Illness  GENITOURINARY: No problems with urination  Denies any hematuria or dysuria  NEUROLOGIC: No dizziness or vertigo, denies headaches  MUSCULOSKELETAL: Denies any muscle or joint pain  SKIN: Denies skin rashes or itching  ENDOCRINE: Denies excessive thirst  Denies intolerance to heat or cold  PSYCHOSOCIAL: Denies depression or anxiety  Denies any recent memory loss         Current Outpatient Medications   Medication Sig Dispense Refill    Cholecalciferol (VITAMIN D3) 5000 units CAPS Take 1 capsule by mouth daily      Insulin Pen Needle (BD PEN NEEDLE JOEL U/F) 32G X 4 MM MISC by Does not apply route daily for 100 days 1 per day 100 each 1    ondansetron (ZOFRAN) 4 mg tablet Take 1 tablet (4 mg total) by mouth every 8 (eight) hours as needed for nausea or vomiting 20 tablet 0    pantoprazole (PROTONIX) 40 mg tablet Take 1 tablet (40 mg total) by mouth daily 30 minutes before breakfast, then 30 minutes before dinner 30 tablet 2    SAXENDA injection INJECT 0 5ML (3MG) SUBCUTANEOUSLY EVERY DAY FOR 30 DAYS 15 mL 0    spironolactone (ALDACTONE) 50 mg tablet Take 1 tablet (50 mg total) by mouth 2 (two) times a day 180 tablet 1    dicyclomine (BENTYL) 20 mg tablet Take 1 tablet (20 mg total) by mouth every 6 (six) hours 60 tablet 2    famotidine (PEPCID) 40 MG tablet Take 1 tablet (40 mg total) by mouth 2 (two) times a day as needed for heartburn 60 tablet 2     No current facility-administered medications for this visit        Past Medical History:   Diagnosis Date    Claustrophobia     Hirsutism     Hyperandrogenism     Hyperglycemia     Hypothyroidism     last assessed 2/10/2017    Memory difficulties     Migraine headache     Premenopausal menorrhagia 01/10/2006    SAB (spontaneous )     Vitamin D deficiency     Word finding difficulty      Past Surgical History:   Procedure Laterality Date    CHOLECYSTECTOMY      COLONOSCOPY      HAND SURGERY      x2 Right hand - lesion excisions    HYSTEROSCOPY W/ ENDOMETRIAL ABLATION       Social History     Socioeconomic History    Marital status: /Civil Union     Spouse name: None    Number of children: None    Years of education: None    Highest education level: None   Occupational History    Occupation: cardiology tech     Employer: [employer]   Social Needs    Financial resource strain: None    Food insecurity:     Worry: None     Inability: None    Transportation needs:     Medical: None     Non-medical: None   Tobacco Use    Smoking status: Former Smoker     Last attempt to quit: 2013     Years since quittin 4    Smokeless tobacco: Never Used Substance and Sexual Activity    Alcohol use: Yes     Frequency: 2-4 times a month     Comment: occasional    Drug use: No    Sexual activity: None   Lifestyle    Physical activity:     Days per week: 0 days     Minutes per session: 0 min    Stress: None   Relationships    Social connections:     Talks on phone: None     Gets together: None     Attends Episcopalian service: None     Active member of club or organization: None     Attends meetings of clubs or organizations: None     Relationship status: None    Intimate partner violence:     Fear of current or ex partner: None     Emotionally abused: None     Physically abused: None     Forced sexual activity: None   Other Topics Concern    None   Social History Narrative    Daily caffeine consumption     Family History   Problem Relation Age of Onset    COPD Mother         severe    Alzheimer's disease Mother     Multiple sclerosis Mother     Depression Mother     Diabetes Father         DM    Heart disease Father         cardiac disorder     Hypertension Father     Kidney disease Maternal Grandfather     Diabetes Paternal Grandmother         DM    Thyroid disease Paternal Grandmother     Breast cancer Paternal Grandmother     Diabetes Paternal Grandfather         DM    Heart disease Paternal Grandfather         cardiac disorder     Diabetes Sister         DM    Thyroid disease Sister     Heart disease Maternal Grandmother         cardiac disorder     Hiatal hernia Maternal Grandmother     Brain cancer Son     Lymphoma Son     Kidney disease Family         renal disorder            PHYSICAL EXAM:    Vitals:    11/20/19 1523   BP: 110/70   Pulse: 81   Weight: 88 2 kg (194 lb 6 4 oz)   Height: 5' 4" (1 626 m)     General Appearance:   Alert and oriented x 3   Cooperative, and in no respiratory distress   HEENT:   Normocephalic, atraumatic, anicteric      Neck:  Supple, symmetrical, trachea midline   Lungs:   Clear to auscultation bilaterally  Heart[de-identified]   Regular rate and rhythm   Abdomen:   Soft, non-tender, non-distended; normal bowel sounds; no masses, no organomegaly    Genitalia:   Deferred    Rectal:   Deferred    Extremities:  No cyanosis, clubbing or edema    Pulses:  2+ and symmetric all extremities    Skin:  Skin color, texture, turgor normal, no rashes or lesions    Lymph nodes:  No palpable cervical or supraclavicular lymphadenopathy        Lab Results:       Results from last 6 Months   Lab Units 08/21/19  0735   POTASSIUM mmol/L 4 0   CHLORIDE mmol/L 105   CO2 mmol/L 26   BUN mg/dL 13   CREATININE mg/dL 0 91   CALCIUM mg/dL 9 5   ALK PHOS U/L 75   ALT U/L 19   AST U/L 10               Imaging Studies: I have personally reviewed pertinent imaging studies  Nm Gastric Emptying    Result Date: 9/9/2019  Impression: Delayed rate of gastric emptying  Workstation performed: [id number]       ASSESSMENT and PLAN:      1) Alternating bowel habits - This is a change for the patient  She is normally not constipated  She is also experiencing lower abdominal pain  To her knowledge, she does not have a family history of IBD  May be secondary to irritable bowel syndrome  However, she is due for a colonoscopy recall  - Colonoscopy to investigate  - Further recommendations based on above  - Bentyl 20 mg as needed    2) Mild delay in gastric emptying - Small frequent meals  Zofran as needed  3) History of colon polyps        Follow up after colonoscopy

## 2019-11-22 ENCOUNTER — TELEPHONE (OUTPATIENT)
Dept: GASTROENTEROLOGY | Facility: CLINIC | Age: 54
End: 2019-11-22

## 2019-11-22 NOTE — TELEPHONE ENCOUNTER
----- Message from Memory Smoke sent at 11/22/2019  8:02 AM EST -----  Regarding: Visit Follow-Up Question  Contact: [phone]  In regards to the paperwork that you filled out, thank you very much  I picked it up yesterday, but read it today  It says that you started treating me 11/21/19  In actuality, you started treating me in March 2019  I'm wondering if you can amend that dates on this form? Rupert if that is possible, but I would be willing to stop by the office today if you are there and available  Please advise

## 2019-11-22 NOTE — TELEPHONE ENCOUNTER
Pt sent message via [facility]  Spoke to pt and advised her that you are out of the office until 12/2, but in the hospital on Monday and that I will send a message to see if this okay to change, she said her deadline is on 11/26  Please advise if it is okay to change the date of her FMLA paper work

## 2019-11-25 NOTE — TELEPHONE ENCOUNTER
Patient notified me that she was able to discuss the paperwork issue with her manager, and it is resolved

## 2019-11-29 ENCOUNTER — HOSPITAL ENCOUNTER (OUTPATIENT)
Dept: RADIOLOGY | Facility: HOSPITAL | Age: 54
Discharge: HOME | End: 2019-11-29
Payer: COMMERCIAL

## 2019-11-29 VITALS — HEIGHT: 64 IN | BODY MASS INDEX: 32.65 KG/M2 | WEIGHT: 191.25 LBS

## 2019-11-29 DIAGNOSIS — Z12.39 BREAST CANCER SCREENING: ICD-10-CM

## 2019-11-29 PROCEDURE — 77067 SCR MAMMO BI INCL CAD: CPT

## 2019-11-29 PROCEDURE — 77063 BREAST TOMOSYNTHESIS BI: CPT

## 2019-12-01 ENCOUNTER — ANESTHESIA EVENT (OUTPATIENT)
Dept: GASTROENTEROLOGY | Facility: HOSPITAL | Age: 54
End: 2019-12-01

## 2019-12-01 RX ORDER — SODIUM CHLORIDE, SODIUM LACTATE, POTASSIUM CHLORIDE, CALCIUM CHLORIDE 600; 310; 30; 20 MG/100ML; MG/100ML; MG/100ML; MG/100ML
125 INJECTION, SOLUTION INTRAVENOUS CONTINUOUS
Status: CANCELLED | OUTPATIENT
Start: 2019-12-01

## 2019-12-01 RX ORDER — LIDOCAINE HYDROCHLORIDE 10 MG/ML
0.5 INJECTION, SOLUTION EPIDURAL; INFILTRATION; INTRACAUDAL; PERINEURAL ONCE AS NEEDED
Status: CANCELLED | OUTPATIENT
Start: 2019-12-01

## 2019-12-01 NOTE — ANESTHESIA PREPROCEDURE EVALUATION
Review of Systems/Medical History  Patient summary reviewed  Chart reviewed  No history of anesthetic complications     Cardiovascular  Negative cardio ROS    Pulmonary  Negative pulmonary ROS Smoker ex-smoker  ,        GI/Hepatic  Negative GI/hepatic ROS   GERD ,        Negative  ROS        Endo/Other  Negative endo/other ROS History of thyroid disease (nodule) , hypothyroidism,   Comment: Hyperandrogenism   Obesity    GYN  Negative gynecology ROS          Hematology  Negative hematology ROS      Musculoskeletal  Negative musculoskeletal ROS   Arthritis     Neurology  Negative neurology ROS   Headaches (migraines),    Psychology   Negative psychology ROS          EKG: ST @ 127bpm, ? LAE    Gastric study 9/9/19: Delayed rate of gastric emptying  No results found for this or any previous visit (from the past 1008 hour(s))  Physical Exam    Airway    Mallampati score: II         Dental   No notable dental hx     Cardiovascular  Comment: Negative ROS, Rhythm: regular, Rate: normal, No murmur,     Pulmonary  Breath sounds clear to auscultation,     Other Findings        Anesthesia Plan  ASA Score- 2     Anesthesia Type- IV sedation with anesthesia with ASA Monitors  Additional Monitors:   Airway Plan:         Plan Factors-    Induction-     Postoperative Plan-     Informed Consent- Anesthetic plan and risks discussed with patient  I personally reviewed this patient with the CRNA  Discussed and agreed on the Anesthesia Plan with the CRNA  Martha Roe

## 2019-12-02 ENCOUNTER — HOSPITAL ENCOUNTER (OUTPATIENT)
Dept: GASTROENTEROLOGY | Facility: HOSPITAL | Age: 54
Setting detail: OUTPATIENT SURGERY
Discharge: HOME | End: 2019-12-02
Attending: INTERNAL MEDICINE | Admitting: INTERNAL MEDICINE
Payer: COMMERCIAL

## 2019-12-02 ENCOUNTER — ANESTHESIA (OUTPATIENT)
Dept: GASTROENTEROLOGY | Facility: HOSPITAL | Age: 54
End: 2019-12-02

## 2019-12-02 VITALS
DIASTOLIC BLOOD PRESSURE: 71 MMHG | TEMPERATURE: 97.8 F | SYSTOLIC BLOOD PRESSURE: 122 MMHG | HEART RATE: 71 BPM | BODY MASS INDEX: 32.22 KG/M2 | WEIGHT: 188.71 LBS | OXYGEN SATURATION: 95 % | HEIGHT: 64 IN | RESPIRATION RATE: 12 BRPM

## 2019-12-02 DIAGNOSIS — R10.9 ABDOMINAL CRAMPING: ICD-10-CM

## 2019-12-02 DIAGNOSIS — K58.0 IRRITABLE BOWEL SYNDROME WITH DIARRHEA: Primary | ICD-10-CM

## 2019-12-02 PROCEDURE — 45380 COLONOSCOPY AND BIOPSY: CPT | Performed by: INTERNAL MEDICINE

## 2019-12-02 PROCEDURE — 45385 COLONOSCOPY W/LESION REMOVAL: CPT | Performed by: INTERNAL MEDICINE

## 2019-12-02 PROCEDURE — 88305 TISSUE EXAM BY PATHOLOGIST: CPT | Performed by: PATHOLOGY

## 2019-12-02 RX ORDER — PROPOFOL 10 MG/ML
INJECTION, EMULSION INTRAVENOUS AS NEEDED
Status: DISCONTINUED | OUTPATIENT
Start: 2019-12-02 | End: 2019-12-02 | Stop reason: SURG

## 2019-12-02 RX ORDER — SODIUM CHLORIDE, SODIUM LACTATE, POTASSIUM CHLORIDE, CALCIUM CHLORIDE 600; 310; 30; 20 MG/100ML; MG/100ML; MG/100ML; MG/100ML
INJECTION, SOLUTION INTRAVENOUS CONTINUOUS PRN
Status: DISCONTINUED | OUTPATIENT
Start: 2019-12-02 | End: 2019-12-02 | Stop reason: SURG

## 2019-12-02 RX ORDER — LIDOCAINE HYDROCHLORIDE 10 MG/ML
INJECTION, SOLUTION INFILTRATION; PERINEURAL AS NEEDED
Status: DISCONTINUED | OUTPATIENT
Start: 2019-12-02 | End: 2019-12-02 | Stop reason: SURG

## 2019-12-02 RX ORDER — OMEPRAZOLE 40 MG/1
40 CAPSULE, DELAYED RELEASE ORAL DAILY
COMMUNITY
End: 2020-03-02 | Stop reason: ALTCHOICE

## 2019-12-02 RX ADMIN — PROPOFOL 20 MG: 10 INJECTION, EMULSION INTRAVENOUS at 07:48

## 2019-12-02 RX ADMIN — SODIUM CHLORIDE, SODIUM LACTATE, POTASSIUM CHLORIDE, AND CALCIUM CHLORIDE: .6; .31; .03; .02 INJECTION, SOLUTION INTRAVENOUS at 07:42

## 2019-12-02 RX ADMIN — PROPOFOL 20 MG: 10 INJECTION, EMULSION INTRAVENOUS at 07:53

## 2019-12-02 RX ADMIN — PROPOFOL 140 MG: 10 INJECTION, EMULSION INTRAVENOUS at 07:46

## 2019-12-02 RX ADMIN — PROPOFOL 40 MG: 10 INJECTION, EMULSION INTRAVENOUS at 07:50

## 2019-12-02 RX ADMIN — LIDOCAINE HYDROCHLORIDE 50 MG: 10 INJECTION, SOLUTION INFILTRATION; PERINEURAL at 07:46

## 2019-12-02 NOTE — DISCHARGE INSTRUCTIONS
Colonoscopy   WHAT YOU NEED TO KNOW:   A colonoscopy is a procedure to examine the inside of your colon (intestine) with a scope  Polyps or tissue growths may have been removed during your colonoscopy  It is normal to feel bloated and to have some abdominal discomfort  You should be passing gas  If you have hemorrhoids or you had polyps removed, you may have a small amount of bleeding  DISCHARGE INSTRUCTIONS:   Seek care immediately if:   · You have a large amount of bright red blood in your bowel movements  · Your abdomen is hard and firm and you have severe pain  · You have sudden trouble breathing  Contact your healthcare provider if:   · You develop a rash or hives  · You have a fever within 24 hours of your procedure  · You have not had a bowel movement for 3 days after your procedure  · You have questions or concerns about your condition or care  Activity:   · Do not lift, strain, or run  for 3 days after your procedure  · Rest after your procedure  You have been given medicine to relax you  Do not  drive or make important decisions until the day after your procedure  Return to your normal activity as directed  · Relieve gas and discomfort from bloating  by lying on your right side with a heating pad on your abdomen  You may need to take short walks to help the gas move out  Eat small meals until bloating is relieved  If you had polyps removed: For 7 days after your procedure:  · Do not  take aspirin  · Do not  go on long car rides  Help prevent constipation:   · Eat a variety of healthy foods  Healthy foods include fruit, vegetables, whole-grain breads, low-fat dairy products, beans, lean meat, and fish  Ask if you need to be on a special diet  Your healthcare provider may recommend that you eat high-fiber foods such as cooked beans  Fiber helps you have regular bowel movements  · Drink liquids as directed    Adults should drink between 9 and 13 eight-ounce cups of liquid every day  Ask what amount is best for you  For most people, good liquids to drink are water, juice, and milk  · Exercise as directed  Talk to your healthcare provider about the best exercise plan for you  Exercise can help prevent constipation, decrease your blood pressure and improve your health  Follow up with your healthcare provider as directed:  Write down your questions so you remember to ask them during your visits  © 2017 [employer] Information is for End User's use only and may not be sold, redistributed or otherwise used for commercial purposes  All illustrations and images included in CareNotes® are the copyrighted property of [employer]  or Selvin Daugherty  The above information is an  only  It is not intended as medical advice for individual conditions or treatments  Talk to your doctor, nurse or pharmacist before following any medical regimen to see if it is safe and effective for you

## 2019-12-02 NOTE — INTERVAL H&P NOTE
H&P reviewed  After examining the patient I find no changes in the patients condition since the H&P had been written      Vitals:    12/02/19 0701   BP: 126/58   Pulse: 86   Resp: 14   Temp: 98 3 °F (36 8 °C)   SpO2: 98%

## 2019-12-02 NOTE — ANESTHESIA POSTPROCEDURE EVALUATION
Post-Op Assessment Note    CV Status:  Stable  Pain Score: 0    Pain management: adequate     Mental Status:  Sleepy   Hydration Status:  Stable   PONV Controlled:  None   Airway Patency:  Patent and adequate   Post Op Vitals Reviewed: Yes      Staff: CRNA           BP   112/64   Temp      Pulse  78   Resp   16   SpO2   98%

## 2019-12-03 ENCOUNTER — TELEPHONE (OUTPATIENT)
Dept: GASTROENTEROLOGY | Facility: CLINIC | Age: 54
End: 2019-12-03

## 2019-12-03 NOTE — TELEPHONE ENCOUNTER
Spoke with Nhi Obrien at [employer] he is faxing a pre-authorization form that needs to be completed and fax back with clinical information

## 2019-12-03 NOTE — TELEPHONE ENCOUNTER
[facility] PT - received a fax from the pt's pharmacy stating she needs an Radha Radford for Xifaxan 550MG

## 2019-12-09 ENCOUNTER — TELEPHONE (OUTPATIENT)
Dept: GASTROENTEROLOGY | Facility: CLINIC | Age: 54
End: 2019-12-09

## 2019-12-09 NOTE — TELEPHONE ENCOUNTER
----- Message from Marshall Jacobo MD sent at 12/9/2019  7:01 AM EST -----  Please tell her that the colon biopsy was normal   Please tell her that the polyp was precancer and that recall is in 5 year

## 2019-12-09 NOTE — TELEPHONE ENCOUNTER
----- Message from Peggy Turner sent at 12/9/2019  8:18 AM EST -----  Regarding: Visit Follow-Up Question  Contact: [phone]  Good Morning! I am having an issue with the [facility] paperwork  I need an updated Medical Certificate  They said that the 1st one didn't have enough information  Is there a way that I can drop the paperwork off and pick it up like I did last time? I'll include the letter they sent so it may be of help as to what they want  Thank you!

## 2019-12-09 NOTE — TELEPHONE ENCOUNTER
LMOM advising pt that this is okay, just to let us know what exactly needs to be done when she drops it off, also that Rush Guido is out of the office until 12/16 so it will not be able to be completed until then  Told to CB if any questions/concerns

## 2019-12-17 DIAGNOSIS — E66.9 LIFELONG OBESITY: ICD-10-CM

## 2019-12-17 RX ORDER — LIRAGLUTIDE 6 MG/ML
INJECTION, SOLUTION SUBCUTANEOUS
Qty: 15 ML | Refills: 0 | Status: SHIPPED | OUTPATIENT
Start: 2019-12-17 | End: 2020-01-30

## 2019-12-27 DIAGNOSIS — E66.9 OBESITY (BMI 30-39.9): ICD-10-CM

## 2019-12-30 DIAGNOSIS — L68.0 HIRSUTISM: ICD-10-CM

## 2019-12-30 RX ORDER — SPIRONOLACTONE 50 MG/1
50 TABLET, FILM COATED ORAL 2 TIMES DAILY
Qty: 180 TABLET | Refills: 1 | Status: SHIPPED | OUTPATIENT
Start: 2019-12-30 | End: 2020-08-14

## 2020-01-30 DIAGNOSIS — E66.9 LIFELONG OBESITY: ICD-10-CM

## 2020-01-30 RX ORDER — LIRAGLUTIDE 6 MG/ML
INJECTION, SOLUTION SUBCUTANEOUS
Qty: 15 ML | Refills: 0 | Status: SHIPPED | OUTPATIENT
Start: 2020-01-30 | End: 2020-03-19 | Stop reason: SDUPTHER

## 2020-02-12 DIAGNOSIS — R10.9 ABDOMINAL CRAMPING: ICD-10-CM

## 2020-02-12 RX ORDER — DICYCLOMINE HCL 20 MG
TABLET ORAL
Qty: 60 TABLET | Refills: 0 | Status: SHIPPED | OUTPATIENT
Start: 2020-02-12 | End: 2020-02-28 | Stop reason: SDUPTHER

## 2020-02-12 RX ORDER — DICYCLOMINE HCL 20 MG
20 TABLET ORAL EVERY 6 HOURS PRN
Qty: 60 TABLET | Refills: 2 | Status: SHIPPED | OUTPATIENT
Start: 2020-02-12 | End: 2020-05-06 | Stop reason: SDUPTHER

## 2020-03-02 ENCOUNTER — OFFICE VISIT (OUTPATIENT)
Dept: ENDOCRINOLOGY | Facility: CLINIC | Age: 55
End: 2020-03-02
Payer: COMMERCIAL

## 2020-03-02 ENCOUNTER — APPOINTMENT (OUTPATIENT)
Dept: LAB | Facility: HOSPITAL | Age: 55
End: 2020-03-02
Attending: INTERNAL MEDICINE
Payer: COMMERCIAL

## 2020-03-02 VITALS
HEART RATE: 84 BPM | SYSTOLIC BLOOD PRESSURE: 112 MMHG | BODY MASS INDEX: 33.49 KG/M2 | HEIGHT: 64 IN | WEIGHT: 196.2 LBS | DIASTOLIC BLOOD PRESSURE: 72 MMHG

## 2020-03-02 DIAGNOSIS — R73.03 PREDIABETES: ICD-10-CM

## 2020-03-02 DIAGNOSIS — E78.5 HYPERLIPIDEMIA, UNSPECIFIED HYPERLIPIDEMIA TYPE: ICD-10-CM

## 2020-03-02 DIAGNOSIS — E66.9 OBESITY (BMI 30-39.9): ICD-10-CM

## 2020-03-02 DIAGNOSIS — E28.8 HYPERANDROGENISM: ICD-10-CM

## 2020-03-02 DIAGNOSIS — E78.2 MIXED HYPERLIPIDEMIA: ICD-10-CM

## 2020-03-02 DIAGNOSIS — E66.9 OBESITY (BMI 30-39.9): Primary | ICD-10-CM

## 2020-03-02 DIAGNOSIS — E55.9 VITAMIN D DEFICIENCY: ICD-10-CM

## 2020-03-02 PROBLEM — R73.9 HYPERGLYCEMIA: Status: RESOLVED | Noted: 2017-08-23 | Resolved: 2020-03-02

## 2020-03-02 LAB
ALBUMIN SERPL BCP-MCNC: 4.1 G/DL (ref 3.5–5)
ALP SERPL-CCNC: 86 U/L (ref 46–116)
ALT SERPL W P-5'-P-CCNC: 18 U/L (ref 12–78)
ANION GAP SERPL CALCULATED.3IONS-SCNC: 6 MMOL/L (ref 4–13)
AST SERPL W P-5'-P-CCNC: 11 U/L (ref 5–45)
BILIRUB SERPL-MCNC: 0.7 MG/DL (ref 0.2–1)
BUN SERPL-MCNC: 17 MG/DL (ref 5–25)
CALCIUM SERPL-MCNC: 9.6 MG/DL (ref 8.3–10.1)
CHLORIDE SERPL-SCNC: 104 MMOL/L (ref 100–108)
CO2 SERPL-SCNC: 29 MMOL/L (ref 21–32)
CREAT SERPL-MCNC: 0.93 MG/DL (ref 0.6–1.3)
EST. AVERAGE GLUCOSE BLD GHB EST-MCNC: 108 MG/DL
GFR SERPL CREATININE-BSD FRML MDRD: 69 ML/MIN/1.73SQ M
GLUCOSE P FAST SERPL-MCNC: 97 MG/DL (ref 65–99)
HBA1C MFR BLD: 5.4 %
POTASSIUM SERPL-SCNC: 4.3 MMOL/L (ref 3.5–5.3)
PROT SERPL-MCNC: 7.9 G/DL (ref 6.4–8.2)
SODIUM SERPL-SCNC: 139 MMOL/L (ref 136–145)
T4 FREE SERPL-MCNC: 0.91 NG/DL (ref 0.76–1.46)
TSH SERPL DL<=0.05 MIU/L-ACNC: 2.61 UIU/ML (ref 0.36–3.74)

## 2020-03-02 PROCEDURE — 83036 HEMOGLOBIN GLYCOSYLATED A1C: CPT

## 2020-03-02 PROCEDURE — 36415 COLL VENOUS BLD VENIPUNCTURE: CPT

## 2020-03-02 PROCEDURE — 84443 ASSAY THYROID STIM HORMONE: CPT

## 2020-03-02 PROCEDURE — 3008F BODY MASS INDEX DOCD: CPT | Performed by: PHYSICIAN ASSISTANT

## 2020-03-02 PROCEDURE — 84439 ASSAY OF FREE THYROXINE: CPT

## 2020-03-02 PROCEDURE — 82627 DEHYDROEPIANDROSTERONE: CPT

## 2020-03-02 PROCEDURE — 99214 OFFICE O/P EST MOD 30 MIN: CPT | Performed by: PHYSICIAN ASSISTANT

## 2020-03-02 PROCEDURE — 80053 COMPREHEN METABOLIC PANEL: CPT

## 2020-03-02 PROCEDURE — 1036F TOBACCO NON-USER: CPT | Performed by: PHYSICIAN ASSISTANT

## 2020-03-02 NOTE — ASSESSMENT & PLAN NOTE
Discussed importance of lifestyle modifications and weight loss and she understands  She will schedule with dietician  Tristan Gruber    Recent A1C/Fasting glucose normal

## 2020-03-02 NOTE — ASSESSMENT & PLAN NOTE
Continue saxenda  Refer to nutritionist    Follow up with PCP/GI- if has more GI symptoms may need to stop the saxenda  Currently she is feeling well and wishes to continue saxenda

## 2020-03-02 NOTE — PROGRESS NOTES
Established Patient Progress Note       Chief Complaint   Patient presents with    Obesity    Abnormal Endocrine Labs    Vitamin D Deficiency        History of Present Illness:     Diya Sequeira is a 63 y o  female with a history of Pre-Diabetes, Obesity, and Vitamin D Deficiency  She does have hirsutism which has improved after starting on spironolactone  She has been able to maintain weight loss on saxenda which was started in 2017- she does not she needs to make dietary improvements and exercise  For Vitamin D Deficiency, taking supplements  She has history of hypothyroidism many years ago- previously was treated with levothyroxine  She was having some GI problems related to GERD which have improved  She has been weaning off antacids  Patient Active Problem List   Diagnosis    Memory difficulties    Abnormal weight gain    Arthritis    Claustrophobia    GERD (gastroesophageal reflux disease)    Hirsutism    Hyperandrogenism    Lump in neck    Migraine headache    Prediabetes    Vitamin D deficiency    Obesity (BMI 30-39  9)    Globus sensation    History of colon polyps    Change in bowel habit      Past Medical History:   Diagnosis Date    Claustrophobia     Colon polyp     Hirsutism     Hyperandrogenism     Hyperglycemia     Hypothyroidism     last assessed 2/10/2017    Memory difficulties     Migraine headache     Premenopausal menorrhagia 01/10/2006    SAB (spontaneous )     Vitamin D deficiency     Word finding difficulty       Past Surgical History:   Procedure Laterality Date    CHOLECYSTECTOMY      COLONOSCOPY      EGD      HAND SURGERY      x2 Right hand - lesion excisions    HYSTEROSCOPY W/ ENDOMETRIAL ABLATION        Family History   Problem Relation Age of Onset    COPD Mother         severe    Alzheimer's disease Mother     Multiple sclerosis Mother     Depression Mother     Diabetes Father         DM    Heart disease Father cardiac disorder     Hypertension Father     Kidney disease Maternal Grandfather     Diabetes Paternal Grandmother         DM    Thyroid disease Paternal Grandmother     Breast cancer Paternal Grandmother     Diabetes Paternal Grandfather         DM    Heart disease Paternal Grandfather         cardiac disorder     Diabetes Sister         DM    Thyroid disease Sister     Heart disease Maternal Grandmother         cardiac disorder     Hiatal hernia Maternal Grandmother     Brain cancer Son     Lymphoma Son     Kidney disease Family         renal disorder    No Known Problems Daughter     No Known Problems Sister     No Known Problems Half-Sister      Social History     Tobacco Use    Smoking status: Former Smoker     Packs/day: 0 25     Types: Cigarettes     Last attempt to quit: 2013     Years since quittin 7    Smokeless tobacco: Never Used   Substance Use Topics    Alcohol use: Yes     Frequency: 2-4 times a month     Drinks per session: 1 or 2     Binge frequency: Never     Comment: occasional     No Known Allergies    Current Outpatient Medications:     Cholecalciferol (VITAMIN D3) 5000 units CAPS, Take 1 capsule by mouth daily, Disp: , Rfl:     dicyclomine (BENTYL) 20 mg tablet, Take 1 tablet (20 mg total) by mouth every 6 (six) hours as needed (abdominal pain/cramping), Disp: 60 tablet, Rfl: 2    famotidine (PEPCID) 40 MG tablet, Take 1 tablet (40 mg total) by mouth 2 (two) times a day as needed for heartburn, Disp: 60 tablet, Rfl: 2    Insulin Pen Needle (BD PEN NEEDLE JOEL U/F) 32G X 4 MM MISC, by Does not apply route daily 1 per day, Disp: 100 each, Rfl: 1    ondansetron (ZOFRAN) 4 mg tablet, Take 1 tablet (4 mg total) by mouth every 8 (eight) hours as needed for nausea or vomiting, Disp: 20 tablet, Rfl: 0    pantoprazole (PROTONIX) 40 mg tablet, Take 1 tablet (40 mg total) by mouth daily 30 minutes before breakfast, then 30 minutes before dinner, Disp: 30 tablet, Rfl: 2   SAXENDA injection, INJECT 0 5ML (3MG) SUBCUTANEOUSLY EVERY DAY FOR 30 DAYS, Disp: 15 mL, Rfl: 0    spironolactone (ALDACTONE) 50 mg tablet, Take 1 tablet (50 mg total) by mouth 2 (two) times a day, Disp: 180 tablet, Rfl: 1    Review of Systems   Constitutional: Negative for activity change, appetite change, chills, diaphoresis, fatigue, fever and unexpected weight change  HENT: Negative for trouble swallowing and voice change  Eyes: Negative for visual disturbance  Respiratory: Negative for shortness of breath  Cardiovascular: Negative for chest pain and palpitations  Gastrointestinal: Negative for abdominal pain, constipation and diarrhea  Endocrine: Negative for cold intolerance, heat intolerance, polydipsia, polyphagia and polyuria  Genitourinary: Negative for frequency and menstrual problem  Musculoskeletal: Negative for arthralgias and myalgias  Skin: Negative for rash  Allergic/Immunologic: Negative for food allergies  Neurological: Negative for dizziness and tremors  Hematological: Negative for adenopathy  Psychiatric/Behavioral: Negative for sleep disturbance  All other systems reviewed and are negative  Physical Exam:  Body mass index is 33 68 kg/m²  /72 (BP Location: Left arm, Patient Position: Sitting, Cuff Size: Standard)   Pulse 84   Ht 5' 4" (1 626 m)   Wt 89 kg (196 lb 3 2 oz)   BMI 33 68 kg/m²    Wt Readings from Last 3 Encounters:   03/02/20 89 kg (196 lb 3 2 oz)   12/02/19 85 6 kg (188 lb 11 4 oz)   11/29/19 86 8 kg (191 lb 4 oz)       Physical Exam   Constitutional: She is oriented to person, place, and time  She appears well-developed and well-nourished  No distress  HENT:   Head: Normocephalic and atraumatic  Eyes: Pupils are equal, round, and reactive to light  Conjunctivae are normal    Neck: Normal range of motion  Neck supple  No thyromegaly present  Cardiovascular: Normal rate, regular rhythm and normal heart sounds     Pulmonary/Chest: Effort normal and breath sounds normal  No respiratory distress  She has no wheezes  She has no rales  Abdominal: Soft  Bowel sounds are normal  She exhibits no distension  There is no tenderness  Musculoskeletal: Normal range of motion  She exhibits no edema  Neurological: She is alert and oriented to person, place, and time  Skin: Skin is warm and dry  Psychiatric: She has a normal mood and affect  Vitals reviewed  Labs:       Lab Results   Component Value Date    CREATININE 0 93 03/02/2020    CREATININE 0 91 08/21/2019    CREATININE 0 99 12/05/2018    BUN 17 03/02/2020    K 4 3 03/02/2020     03/02/2020    CO2 29 03/02/2020     eGFR   Date Value Ref Range Status   03/02/2020 69 ml/min/1 73sq m Final       Lab Results   Component Value Date    CHOL 193 08/11/2015    HDL 48 08/21/2019    TRIG 149 08/21/2019       Lab Results   Component Value Date    ALT 18 03/02/2020    AST 11 03/02/2020    ALKPHOS 86 03/02/2020       Lab Results   Component Value Date    FREET4 0 91 03/02/2020         Impression & Plan:    Problem List Items Addressed This Visit        Endocrine    Hyperandrogenism     Continue spironolactone  DHEA-S level pending  Other    Prediabetes     Discussed importance of lifestyle modifications and weight loss and she understands  She will schedule with dietician  Continue Saxenda  Recent A1C/Fasting glucose normal           Relevant Orders    Ambulatory referral to Nutrition Services    Hemoglobin A1C    Comprehensive metabolic panel    Vitamin D deficiency     Continue Supplements  Relevant Orders    Vitamin D 25 hydroxy    Vitamin D 25 hydroxy    Obesity (BMI 30-39 9) - Primary     Continue saxenda  Refer to nutritionist    Follow up with PCP/GI- if has more GI symptoms may need to stop the saxenda  Currently she is feeling well and wishes to continue saxenda            Relevant Orders    Ambulatory referral to Nutrition Services      Other Visit Diagnoses Mixed hyperlipidemia        Relevant Orders    Lipid Panel with Direct LDL reflex    Hyperlipidemia, unspecified hyperlipidemia type        Relevant Orders    TSH, 3rd generation    T4, free    Vitamin D 25 hydroxy          Orders Placed This Encounter   Procedures    Vitamin D 25 hydroxy     Standing Status:   Future     Standing Expiration Date:   9/2/2020    Hemoglobin A1C     Standing Status:   Future     Standing Expiration Date:   9/2/2021    Comprehensive metabolic panel     This is a patient instruction: Patient fasting for 8 hours or longer recommended  Standing Status:   Future     Standing Expiration Date:   9/2/2021    Lipid Panel with Direct LDL reflex     This is a patient instruction: This test requires patient fasting for 10-12 hours or longer  Drinking of black coffee or black tea is acceptable  Standing Status:   Future     Standing Expiration Date:   9/2/2021    TSH, 3rd generation     This is a patient instruction: This test is non-fasting  Please drink two glasses of water morning of bloodwork  Standing Status:   Future     Standing Expiration Date:   9/2/2021    T4, free     Standing Status:   Future     Standing Expiration Date:   9/2/2021    Vitamin D 25 hydroxy     Standing Status:   Future     Standing Expiration Date:   9/2/2021    Ambulatory referral to Nutrition Services     Standing Status:   Future     Standing Expiration Date:   3/2/2021     Referral Priority:   Routine     Referral Type:   Consult - AMB     Referral Reason:   Specialty Services Required     Requested Specialty:   Nutrition     Number of Visits Requested:   1     Expiration Date:   3/2/2021       There are no Patient Instructions on file for this visit  Discussed with the patient and all questioned fully answered  She will call me if any problems arise  Follow-up appointment in 12 months       Counseled patient on diagnostic results, prognosis, risk and benefit of treatment options, instruction for management, importance of treatment compliance, Risk  factor reduction and impressions      Justine Noriega PA-C

## 2020-03-03 LAB — DHEA-S SERPL-MCNC: 226.6 UG/DL (ref 29.4–220.5)

## 2020-03-10 ENCOUNTER — APPOINTMENT (OUTPATIENT)
Dept: LAB | Facility: HOSPITAL | Age: 55
End: 2020-03-10
Payer: COMMERCIAL

## 2020-03-10 ENCOUNTER — TRANSCRIBE ORDERS (OUTPATIENT)
Dept: ADMINISTRATIVE | Facility: HOSPITAL | Age: 55
End: 2020-03-10

## 2020-03-10 DIAGNOSIS — E55.9 VITAMIN D DEFICIENCY: ICD-10-CM

## 2020-03-10 DIAGNOSIS — Z00.8 HEALTH EXAMINATION IN POPULATION SURVEY: Primary | ICD-10-CM

## 2020-03-10 DIAGNOSIS — Z00.8 HEALTH EXAMINATION IN POPULATION SURVEY: ICD-10-CM

## 2020-03-10 LAB — 25(OH)D3 SERPL-MCNC: 51.3 NG/ML (ref 30–100)

## 2020-03-10 PROCEDURE — 36415 COLL VENOUS BLD VENIPUNCTURE: CPT

## 2020-03-10 PROCEDURE — 82306 VITAMIN D 25 HYDROXY: CPT

## 2020-03-19 DIAGNOSIS — E66.9 LIFELONG OBESITY: ICD-10-CM

## 2020-03-19 NOTE — TELEPHONE ENCOUNTER
----- Message from Teresita Persaud sent at 3/19/2020  9:51 AM EDT -----  Regarding: Prescription Question  Contact: [phone]  Would you please call in a refill prescription for my Saxenda? I am on the last week of this medication  I use [facility] in [place]  They know to send it to [facility]  Thank you for your time

## 2020-05-06 DIAGNOSIS — R10.9 ABDOMINAL CRAMPING: ICD-10-CM

## 2020-05-06 RX ORDER — DICYCLOMINE HCL 20 MG
20 TABLET ORAL EVERY 6 HOURS PRN
Qty: 60 TABLET | Refills: 2 | Status: SHIPPED | OUTPATIENT
Start: 2020-05-06 | End: 2020-09-10 | Stop reason: SDUPTHER

## 2020-07-29 ENCOUNTER — TELEPHONE (OUTPATIENT)
Dept: GASTROENTEROLOGY | Facility: CLINIC | Age: 55
End: 2020-07-29

## 2020-07-29 NOTE — TELEPHONE ENCOUNTER
----- Message from Adela Newell sent at 7/29/2020  3:37 PM EDT -----  Regarding: Non-Urgent Medical Question  Contact: [phone]  Would we be able to set up an appointment just to update my records? Also, I believe I need to update my FMLA as well

## 2020-08-14 DIAGNOSIS — L68.0 HIRSUTISM: ICD-10-CM

## 2020-08-14 RX ORDER — SPIRONOLACTONE 50 MG/1
50 TABLET, FILM COATED ORAL 2 TIMES DAILY
Qty: 180 TABLET | Refills: 0 | Status: SHIPPED | OUTPATIENT
Start: 2020-08-14 | End: 2020-08-19

## 2020-08-19 DIAGNOSIS — L68.0 HIRSUTISM: ICD-10-CM

## 2020-08-19 RX ORDER — SPIRONOLACTONE 50 MG/1
50 TABLET, FILM COATED ORAL 2 TIMES DAILY
Qty: 180 TABLET | Refills: 0 | Status: SHIPPED | OUTPATIENT
Start: 2020-08-19 | End: 2021-07-14 | Stop reason: SDUPTHER

## 2020-08-19 RX ORDER — SPIRONOLACTONE 50 MG/1
50 TABLET, FILM COATED ORAL 2 TIMES DAILY
Qty: 180 TABLET | Refills: 0 | Status: CANCELLED | OUTPATIENT
Start: 2020-08-19 | End: 2021-02-15

## 2020-08-19 NOTE — TELEPHONE ENCOUNTER
Patient sent request for Spironolactone  Med was filled and sent to [facility] on 8/14/20  Sent [employer] message to patient notifying her

## 2020-09-02 ENCOUNTER — OFFICE VISIT (OUTPATIENT)
Dept: GASTROENTEROLOGY | Facility: CLINIC | Age: 55
End: 2020-09-02
Payer: COMMERCIAL

## 2020-09-02 VITALS
TEMPERATURE: 96.7 F | BODY MASS INDEX: 33.15 KG/M2 | DIASTOLIC BLOOD PRESSURE: 80 MMHG | SYSTOLIC BLOOD PRESSURE: 128 MMHG | WEIGHT: 199 LBS | HEART RATE: 103 BPM | HEIGHT: 65 IN

## 2020-09-02 DIAGNOSIS — K21.9 GASTROESOPHAGEAL REFLUX DISEASE, ESOPHAGITIS PRESENCE NOT SPECIFIED: ICD-10-CM

## 2020-09-02 DIAGNOSIS — K58.0 IRRITABLE BOWEL SYNDROME WITH DIARRHEA: Primary | ICD-10-CM

## 2020-09-02 PROCEDURE — 99214 OFFICE O/P EST MOD 30 MIN: CPT | Performed by: PHYSICIAN ASSISTANT

## 2020-09-04 ENCOUNTER — TELEPHONE (OUTPATIENT)
Dept: GASTROENTEROLOGY | Facility: CLINIC | Age: 55
End: 2020-09-04

## 2020-09-04 NOTE — TELEPHONE ENCOUNTER
[employer] plan called x[employer] approved for 14 days 3 tablets a day one time fill [facility] phone number with any questions is  [phone]

## 2020-09-10 DIAGNOSIS — R10.9 ABDOMINAL CRAMPING: ICD-10-CM

## 2020-09-10 RX ORDER — DICYCLOMINE HCL 20 MG
20 TABLET ORAL EVERY 6 HOURS PRN
Qty: 90 TABLET | Refills: 2 | Status: SHIPPED | OUTPATIENT
Start: 2020-09-10 | End: 2021-05-16 | Stop reason: SDUPTHER

## 2020-10-05 ENCOUNTER — OFFICE VISIT (OUTPATIENT)
Dept: URGENT CARE | Facility: MEDICAL CENTER | Age: 55
End: 2020-10-05
Payer: COMMERCIAL

## 2020-10-05 DIAGNOSIS — H10.32 ACUTE BACTERIAL CONJUNCTIVITIS OF LEFT EYE: Primary | ICD-10-CM

## 2020-10-05 PROCEDURE — G0382 LEV 3 HOSP TYPE B ED VISIT: HCPCS | Performed by: PHYSICIAN ASSISTANT

## 2020-10-05 RX ORDER — ERYTHROMYCIN 5 MG/G
0.5 OINTMENT OPHTHALMIC
Qty: 3.5 G | Refills: 0 | Status: SHIPPED | OUTPATIENT
Start: 2020-10-05

## 2020-10-06 DIAGNOSIS — E66.9 OBESITY (BMI 30-39.9): ICD-10-CM

## 2020-10-06 RX ORDER — PEN NEEDLE, DIABETIC 32GX 5/32"
NEEDLE, DISPOSABLE MISCELLANEOUS
Qty: 100 EACH | Refills: 1 | Status: SHIPPED | OUTPATIENT
Start: 2020-10-06

## 2020-10-21 ENCOUNTER — OFFICE VISIT (OUTPATIENT)
Dept: INTERNAL MEDICINE CLINIC | Facility: CLINIC | Age: 55
End: 2020-10-21
Payer: COMMERCIAL

## 2020-10-21 VITALS
HEIGHT: 65 IN | HEART RATE: 88 BPM | BODY MASS INDEX: 33.62 KG/M2 | DIASTOLIC BLOOD PRESSURE: 70 MMHG | WEIGHT: 201.8 LBS | SYSTOLIC BLOOD PRESSURE: 112 MMHG | TEMPERATURE: 97.3 F | OXYGEN SATURATION: 98 %

## 2020-10-21 DIAGNOSIS — Z78.0 ASYMPTOMATIC POSTMENOPAUSAL STATE: ICD-10-CM

## 2020-10-21 DIAGNOSIS — E66.9 OBESITY (BMI 30-39.9): ICD-10-CM

## 2020-10-21 DIAGNOSIS — Z00.00 ENCOUNTER FOR WELLNESS EXAMINATION IN ADULT: Primary | ICD-10-CM

## 2020-10-21 DIAGNOSIS — H90.3 SENSORINEURAL HEARING LOSS (SNHL) OF BOTH EARS: ICD-10-CM

## 2020-10-21 DIAGNOSIS — Z12.31 ENCOUNTER FOR SCREENING MAMMOGRAM FOR BREAST CANCER: ICD-10-CM

## 2020-10-21 PROCEDURE — 99396 PREV VISIT EST AGE 40-64: CPT | Performed by: INTERNAL MEDICINE

## 2020-11-24 DIAGNOSIS — K21.9 GASTROESOPHAGEAL REFLUX DISEASE WITHOUT ESOPHAGITIS: ICD-10-CM

## 2020-11-24 RX ORDER — FAMOTIDINE 40 MG/1
40 TABLET, FILM COATED ORAL 2 TIMES DAILY PRN
Qty: 60 TABLET | Refills: 2 | Status: SHIPPED | OUTPATIENT
Start: 2020-11-24 | End: 2021-07-14 | Stop reason: SDUPTHER

## 2020-12-31 ENCOUNTER — LAB (OUTPATIENT)
Dept: LAB | Facility: CLINIC | Age: 55
End: 2020-12-31
Payer: COMMERCIAL

## 2020-12-31 ENCOUNTER — IMMUNIZATIONS (OUTPATIENT)
Dept: FAMILY MEDICINE CLINIC | Facility: HOSPITAL | Age: 55
End: 2020-12-31

## 2020-12-31 DIAGNOSIS — E78.5 HYPERLIPIDEMIA, UNSPECIFIED HYPERLIPIDEMIA TYPE: ICD-10-CM

## 2020-12-31 DIAGNOSIS — R73.03 PREDIABETES: ICD-10-CM

## 2020-12-31 DIAGNOSIS — E55.9 VITAMIN D DEFICIENCY: ICD-10-CM

## 2020-12-31 DIAGNOSIS — E78.2 MIXED HYPERLIPIDEMIA: ICD-10-CM

## 2020-12-31 DIAGNOSIS — Z23 ENCOUNTER FOR IMMUNIZATION: ICD-10-CM

## 2020-12-31 LAB
25(OH)D3 SERPL-MCNC: 42.7 NG/ML (ref 30–100)
ALBUMIN SERPL BCP-MCNC: 4.2 G/DL (ref 3.5–5)
ALP SERPL-CCNC: 82 U/L (ref 46–116)
ALT SERPL W P-5'-P-CCNC: 27 U/L (ref 12–78)
ANION GAP SERPL CALCULATED.3IONS-SCNC: 5 MMOL/L (ref 4–13)
AST SERPL W P-5'-P-CCNC: 15 U/L (ref 5–45)
BILIRUB SERPL-MCNC: 0.79 MG/DL (ref 0.2–1)
BUN SERPL-MCNC: 13 MG/DL (ref 5–25)
CALCIUM SERPL-MCNC: 10.4 MG/DL (ref 8.3–10.1)
CHLORIDE SERPL-SCNC: 107 MMOL/L (ref 100–108)
CHOLEST SERPL-MCNC: 240 MG/DL (ref 50–200)
CO2 SERPL-SCNC: 28 MMOL/L (ref 21–32)
CREAT SERPL-MCNC: 0.87 MG/DL (ref 0.6–1.3)
EST. AVERAGE GLUCOSE BLD GHB EST-MCNC: 105 MG/DL
GFR SERPL CREATININE-BSD FRML MDRD: 75 ML/MIN/1.73SQ M
GLUCOSE P FAST SERPL-MCNC: 81 MG/DL (ref 65–99)
HBA1C MFR BLD: 5.3 %
HDLC SERPL-MCNC: 54 MG/DL
LDLC SERPL CALC-MCNC: 164 MG/DL (ref 0–100)
POTASSIUM SERPL-SCNC: 4.2 MMOL/L (ref 3.5–5.3)
PROT SERPL-MCNC: 7.5 G/DL (ref 6.4–8.2)
SODIUM SERPL-SCNC: 140 MMOL/L (ref 136–145)
T4 FREE SERPL-MCNC: 0.93 NG/DL (ref 0.76–1.46)
TRIGL SERPL-MCNC: 111 MG/DL
TSH SERPL DL<=0.05 MIU/L-ACNC: 1.93 UIU/ML (ref 0.36–3.74)

## 2020-12-31 PROCEDURE — 91301 SARS-COV-2 / COVID-19 MRNA VACCINE (MODERNA) 100 MCG: CPT

## 2020-12-31 PROCEDURE — 84439 ASSAY OF FREE THYROXINE: CPT

## 2020-12-31 PROCEDURE — 83036 HEMOGLOBIN GLYCOSYLATED A1C: CPT

## 2020-12-31 PROCEDURE — 84443 ASSAY THYROID STIM HORMONE: CPT

## 2020-12-31 PROCEDURE — 82306 VITAMIN D 25 HYDROXY: CPT

## 2020-12-31 PROCEDURE — 80061 LIPID PANEL: CPT

## 2020-12-31 PROCEDURE — 36415 COLL VENOUS BLD VENIPUNCTURE: CPT

## 2020-12-31 PROCEDURE — 0011A SARS-COV-2 / COVID-19 MRNA VACCINE (MODERNA) 100 MCG: CPT

## 2020-12-31 PROCEDURE — 80053 COMPREHEN METABOLIC PANEL: CPT

## 2021-01-04 DIAGNOSIS — R73.03 PREDIABETES: ICD-10-CM

## 2021-01-04 DIAGNOSIS — E66.09 CLASS 1 OBESITY DUE TO EXCESS CALORIES WITH SERIOUS COMORBIDITY AND BODY MASS INDEX (BMI) OF 33.0 TO 33.9 IN ADULT: Primary | ICD-10-CM

## 2021-01-05 ENCOUNTER — TELEPHONE (OUTPATIENT)
Dept: ENDOCRINOLOGY | Facility: CLINIC | Age: 56
End: 2021-01-05

## 2021-01-05 DIAGNOSIS — E78.5 HYPERLIPIDEMIA, UNSPECIFIED HYPERLIPIDEMIA TYPE: ICD-10-CM

## 2021-01-05 DIAGNOSIS — E78.2 MIXED HYPERLIPIDEMIA: Primary | ICD-10-CM

## 2021-01-05 DIAGNOSIS — E83.52 HIGH CALCIUM LEVELS: ICD-10-CM

## 2021-01-05 NOTE — TELEPHONE ENCOUNTER
----- Message from Elver Reynoso PA-C sent at 1/4/2021 10:16 AM EST -----  Cholesterol significantly higher than before- maybe holiday related?   Calcium slightly high  Avoid excess calcium supplement/tums and be sure to keep well hydrated  Repeat labs before next visit for CMP, Fasting Lipid Panel, PTH

## 2021-01-13 DIAGNOSIS — E66.9 LIFELONG OBESITY: ICD-10-CM

## 2021-01-13 RX ORDER — LIRAGLUTIDE 6 MG/ML
3 INJECTION, SOLUTION SUBCUTANEOUS DAILY
Qty: 15 ML | Refills: 2 | Status: SHIPPED | OUTPATIENT
Start: 2021-01-13 | End: 2021-08-27 | Stop reason: SDUPTHER

## 2021-02-08 ENCOUNTER — CLINICAL SUPPORT (OUTPATIENT)
Dept: NUTRITION | Facility: HOSPITAL | Age: 56
End: 2021-02-08
Attending: INTERNAL MEDICINE
Payer: COMMERCIAL

## 2021-02-08 VITALS — WEIGHT: 203 LBS | HEIGHT: 65 IN | BODY MASS INDEX: 33.82 KG/M2

## 2021-02-08 DIAGNOSIS — R73.03 PREDIABETES: ICD-10-CM

## 2021-02-08 DIAGNOSIS — E66.09 CLASS 1 OBESITY DUE TO EXCESS CALORIES WITH SERIOUS COMORBIDITY AND BODY MASS INDEX (BMI) OF 33.0 TO 33.9 IN ADULT: ICD-10-CM

## 2021-02-08 PROCEDURE — 97802 MEDICAL NUTRITION INDIV IN: CPT | Performed by: DIETITIAN, REGISTERED

## 2021-02-08 NOTE — PATIENT INSTRUCTIONS
Your First Steps to Health Restoration! Lifestyle Medicine is the use of evidenced-based therapies such as a whole food, plant-predominant dietary lifestyle, regular physical activity, restorative sleep, stress management, avoidance of risky substances and positive social connection for the treatment of chronic disease  Decrease meat, processed and fast foods and refined sugar: Choose fiber-filled, nutrient-dense whole plant foods to fill half to 3/4 of your plate  Drink water to quench your thirst    Spend less time sitting: Build more activity into your daily life, such as parking farther away or always taking the stairs  Choose fitness activities you enjoy, such as walking, moving in water or biking  Build up slowly, with a goal of at least 30 minutes, 5x per week  Distance yourself from stressful situations: Take time  Breathe- try meditation, yoga, and spending time in nature  Recognize stress that leads to improved health and productivity vs stress that leads to anxiety, depression, obesity, immune dysfunction and more  Avoid screen time at least 90 minutes before bedtime: Develop an evening routine to relax  Aim for 7-9 hours of sleep per night  For the best quality of sleep, make your room cool, dark, quiet, and comfortable  Reduce or eliminate smoking, vaping, and alcohol intake: Substitute other relaxing activities and talk to your physician if you need help  Understand the well-documented dangers of any addictive substance can increase risk for many cancers and heart disease  Avoid isolation from friends and family: Spend time with those who lift your spirits and help others when you feel down  Set regular times to engage with others  Social connectedness is essential to emotional resiliency         (Adapted from [address], 2021)

## 2021-02-08 NOTE — PROGRESS NOTES
Initial Nutrition Assessment Form    Patient Name: Mich Hernández    YOB: 1965    Sex: Female     Assessment Date: 2021  Start Time: 10A Stop Time: 11A Total Minutes: 60     Data:  Present at session: self   Parent/Patient Concerns: "general healthy weight loss"   Medical Dx/Reason for Referral: E66 09, Z68 33- Class 1 obesity due to excess calories with serious comordibity and BMI of 33 0-33 9 in adult    R73 03 Prediabetes   Past Medical History:   Diagnosis Date    Claustrophobia     Colon polyp     Hirsutism     Hyperandrogenism     Hyperglycemia     Hypothyroidism     last assessed 2/10/2017    Memory difficulties     Migraine headache     Premenopausal menorrhagia 01/10/2006    SAB (spontaneous )     Vitamin D deficiency     Word finding difficulty        Current Outpatient Medications   Medication Sig Dispense Refill    Cholecalciferol (VITAMIN D3) 5000 units CAPS Take 1 capsule by mouth daily      dicyclomine (BENTYL) 20 mg tablet Take 1 tablet (20 mg total) by mouth every 6 (six) hours as needed (abdominal pain/cramping) 90 tablet 2    erythromycin (ILOTYCIN) ophthalmic ointment Administer 0 5 inches into the left eye daily at bedtime 3 5 g 0    famotidine (PEPCID) 40 MG tablet Take 1 tablet (40 mg total) by mouth 2 (two) times a day as needed for heartburn 60 tablet 2    Insulin Pen Needle (BD Pen Needle Cuca U/F) 32G X 4 MM MISC Use 1 daily 100 each 1    liraglutide (Saxenda) injection Inject 0 5 mL (3 mg total) under the skin daily 15 mL 2    omeprazole (PriLOSEC) 40 MG capsule Take 1 capsule (40 mg total) by mouth daily 30 capsule 3    ondansetron (ZOFRAN) 4 mg tablet Take 1 tablet (4 mg total) by mouth every 8 (eight) hours as needed for nausea or vomiting 20 tablet 0    spironolactone (ALDACTONE) 50 mg tablet Take 1 tablet (50 mg total) by mouth 2 (two) times a day 180 tablet 0     No current facility-administered medications for this visit  Additional Meds/Supplements:  Digestive Chew (for IBS)   Special Learning Needs: none   Height: 5'5"   Weight: Wt Readings from Last 10 Encounters:   21 90 7 kg (200 lb)   10/21/20 91 5 kg (201 lb 12 8 oz)   20 90 3 kg (199 lb)   20 89 kg (196 lb 3 2 oz)   19 85 6 kg (188 lb 11 4 oz)   19 86 8 kg (191 lb 4 oz)   19 88 2 kg (194 lb 6 4 oz)   10/29/19 88 8 kg (195 lb 12 8 oz)   19 91 4 kg (201 lb 6 4 oz)   19 90 kg (198 lb 6 oz)     Estimated body mass index is 33 28 kg/m² as calculated from the following:    Height as of 21: 5' 5" (1 651 m)  Weight as of 21: 90 7 kg (200 lb)  Recent Weight Change: []Yes     [x]No  Amount:  pt states her weight fluctuates between 199-203#      Energy Needs: [place]- [address] Equation:  [id number] (mifflin x sedentary activity factor)   No Known Allergies    Social History     Substance and Sexual Activity   Alcohol Use Yes    Frequency: 2-4 times a month    Drinks per session: 1 or 2    Binge frequency: Never    Comment: occasional       Social History     Tobacco Use   Smoking Status Former Smoker    Packs/day: 0 25    Types: Cigarettes    Quit date: 2013    Years since quittin 6   Smokeless Tobacco Never Used       Who shops? [employer]   Who cooks? patient and    Exercise:  no set exercise routine   Prior Counseling? []Yes     [x]No  When:      Why:         Diet Hx:  Breakfast:    2 cups of coffee with little bit of cream (8oz)  1/4 cup of blueberries or small/medium sized apple   a m  8:30-9   Lunch:    1-1 5 cups salad with 2 tbsp low fat dressing  8oz water  Cheese cubes 1/4 cup  11:30 a m  Dinner:    1/2 bowl chicken noodle soup  Bowl of beef stew  Sausage link 6" chicken sausage, feta, spinach, 1/4 cup french fries, 2 hamburgers with no bun (horseradish or mustard) a m  4:30-6:30         Snacks:     Between lunch and dinner-  Cheddar cheese PB cracker packets (1/2 of packet)    20oz water daily- sometimes uses [employer] packet         Nutrition Diagnosis:   Overweight/obesity  related to Excess energy intake, physical inactivity as evidenced by  BMI more than normative standard for age and sex (obesity-grade I 26-30  9)       Medical Nutrition Therapy Intervention:  [x]Individualized Meal Plan: 0566-1385 calorie meal plan []Understanding Lab Values   []Basic Pathophysiology of Disease []Food/Medication Interactions   [x]Food Diary: reviewed current food recall from past 3 days [x]Exercise: Recommend 150 minutes of moderate intensity exercise per week (or 30 minutes of moderate intensity exercise x5 days per week); utilize [employer] for free workout videos instead of going to gym during [facility]  [x]Lifestyle/Behavior Modification Techniques  Reviewed WFPB diet: Choose fiber-filled, nutrient-dense whole plant foods to fill half to 3/4 of your plate, including Vegetables and fruits, Legumes, whole grains, nuts/seeds; Limit/avoid sugary drinks/beverages, processed meats and snacks, red meats/poultry/eggs/high-fat dairy    Reviewed 6 pillars of Lifestyle Medicine: WFPB diet/nutrition, physical activity, sleep, stress management, fostering healthy relationships/positive psychology, and harmful substance avoidance  Portion control: Measure portions as often as possible for accurate calorie counting using measuring spoons/cups as able, use hands to estimate portions if kitchen utensils are not available; download [employer] for calorie tracking; half of plate should be nonstarchy vegetables; reduce CHO servings to 1/3-1/4 cup  Mindful eating techniques: slowing down rate of eating to 30 minutes per meal; waiting 20 minutes and drinking a glass of water before getting 2nd helping of food item; focus on protein source and vegetable when getting 2nd helping vs carbohydrate      []Medication, Mechanism of Action   [x]Label Reading:  >3g fiber per serving; <350mg sodium per serving []Self Blood Glucose Monitoring   [x]Weight/BMI Goals: pt's goal weight is between 140-150#  No set timeframe to achieve this goal  Aiming for at least a 5# weight loss by next follow up in 6 weeks, goal to achieve is a 5% loss (~10#)  []Other -    Other Notes:    Dry roasted peanuts- itchy, hives  Reports has difficulty tolerating cucumbers, bell peppers  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    Sleep habits: does feel rested even though wakes up once during the night, 6-7 hours of sleep per night  Comprehension: []Excellent  []Very Good  [x]Good  []Fair   []Poor    Receptivity: []Excellent  [x]Very Good  []Good  []Fair   []Poor    Expected Compliance: []Excellent  []Very Good  [x]Good  []Fair   []Poor        Goals:  1  Margaret Floyd will facilitate weight loss by achieving 5% loss by next follow up in 6 weeks (~10# loss, aiming for at least 5#)   2  Margaret Floyd will increase her exercise efforts by aiming to complete 150 minutes of  Moderate exercise per week   3  Margaret Floyd will increase her vegetable consumption at each meal by aiming to include at least 1/2 cup vegetables at meals  4  Margaret Floyd will maintain 1120-9957 calorie intake by tracking calories on an [employer] ([employer], [employer], etc)       No follow-ups on file    Labs:  CMP  Lab Results   Component Value Date    K 4 2 12/31/2020     12/31/2020    CO2 28 12/31/2020    BUN 13 12/31/2020    CREATININE 0 87 12/31/2020    GLUF 81 12/31/2020    CALCIUM 10 4 (H) 12/31/2020    AST 15 12/31/2020    ALT 27 12/31/2020    ALKPHOS 82 12/31/2020    EGFR 75 12/31/2020       BMP  Lab Results   Component Value Date    CALCIUM 10 4 (H) 12/31/2020    K 4 2 12/31/2020    CO2 28 12/31/2020     12/31/2020    BUN 13 12/31/2020    CREATININE 0 87 12/31/2020       Lipids  Lab Results   Component Value Date    CHOL 193 08/11/2015    CHOL 175 06/06/2014     Lab Results   Component Value Date    HDL 54 12/31/2020    HDL 48 08/21/2019    HDL 50 12/05/2018     Lab Results   Component Value Date LDLCALC 164 (H) 12/31/2020    LDLCALC 120 (H) 08/21/2019    LDLCALC 152 (H) 12/05/2018     Lab Results   Component Value Date    TRIG 111 12/31/2020    TRIG 149 08/21/2019    TRIG 120 12/05/2018     No results found for: CHOLHDL    Hemoglobin A1C  Lab Results   Component Value Date    HGBA1C 5 3 12/31/2020       Fasting Glucose  Lab Results   Component Value Date    GLUF 81 12/31/2020       Insulin     Thyroid  No results found for: TSH, G2HKYCR, H0HKEWV, THYROIDAB    Hepatic Function Panel  Lab Results   Component Value Date    ALT 27 12/31/2020    AST 15 12/31/2020    ALKPHOS 82 12/31/2020       Celiac Disease Antibody Panel  No results found for: ENDOMYSIAL IGA, GLIADIN IGA, GLIADIN IGG, IGA, TISSUE TRANSGLUT AB, TTG IGA   Iron  No results found for: IRON, TIBC, FERRITIN    Vitamins  No results found for: VITAMIN B2   No results found for: NICOTINAMIDE, NICOTINIC ACID   No results found for: VITAMINB6  Lab Results   Component Value Date    IRKTKIDH77 353 12/15/2017     No results found for: VITB5  No results found for: Q2LYJDXP  No results found for: THYROGLB  No results found for: VITAMIN K   No results found for: 25-HYDROXY VIT D   No components found for: VITAMINE     Erick Johnson MS, RD, DipACLM, LDN  [email or web address]  [address]  [address] [address]

## 2021-02-27 ENCOUNTER — LAB (OUTPATIENT)
Dept: LAB | Facility: CLINIC | Age: 56
End: 2021-02-27
Payer: COMMERCIAL

## 2021-02-27 ENCOUNTER — TRANSCRIBE ORDERS (OUTPATIENT)
Dept: LAB | Facility: CLINIC | Age: 56
End: 2021-02-27

## 2021-02-27 DIAGNOSIS — E78.5 HYPERLIPIDEMIA, UNSPECIFIED HYPERLIPIDEMIA TYPE: ICD-10-CM

## 2021-02-27 DIAGNOSIS — E83.52 HIGH CALCIUM LEVELS: ICD-10-CM

## 2021-02-27 LAB
ALBUMIN SERPL BCP-MCNC: 4.1 G/DL (ref 3.5–5)
ALP SERPL-CCNC: 78 U/L (ref 46–116)
ALT SERPL W P-5'-P-CCNC: 21 U/L (ref 12–78)
ANION GAP SERPL CALCULATED.3IONS-SCNC: 7 MMOL/L (ref 4–13)
AST SERPL W P-5'-P-CCNC: 13 U/L (ref 5–45)
BILIRUB SERPL-MCNC: 0.72 MG/DL (ref 0.2–1)
BUN SERPL-MCNC: 17 MG/DL (ref 5–25)
CALCIUM SERPL-MCNC: 9.4 MG/DL (ref 8.3–10.1)
CHLORIDE SERPL-SCNC: 105 MMOL/L (ref 100–108)
CHOLEST SERPL-MCNC: 209 MG/DL (ref 50–200)
CO2 SERPL-SCNC: 28 MMOL/L (ref 21–32)
CREAT SERPL-MCNC: 0.99 MG/DL (ref 0.6–1.3)
GFR SERPL CREATININE-BSD FRML MDRD: 63 ML/MIN/1.73SQ M
GLUCOSE P FAST SERPL-MCNC: 92 MG/DL (ref 65–99)
HDLC SERPL-MCNC: 53 MG/DL
LDLC SERPL CALC-MCNC: 116 MG/DL (ref 0–100)
NONHDLC SERPL-MCNC: 156 MG/DL
POTASSIUM SERPL-SCNC: 4.6 MMOL/L (ref 3.5–5.3)
PROT SERPL-MCNC: 7.5 G/DL (ref 6.4–8.2)
PTH-INTACT SERPL-MCNC: 50 PG/ML (ref 18.4–80.1)
SODIUM SERPL-SCNC: 140 MMOL/L (ref 136–145)
TRIGL SERPL-MCNC: 199 MG/DL

## 2021-02-27 PROCEDURE — 83970 ASSAY OF PARATHORMONE: CPT

## 2021-02-27 PROCEDURE — 80053 COMPREHEN METABOLIC PANEL: CPT

## 2021-02-27 PROCEDURE — 36415 COLL VENOUS BLD VENIPUNCTURE: CPT

## 2021-02-27 PROCEDURE — 80061 LIPID PANEL: CPT

## 2021-03-02 ENCOUNTER — OFFICE VISIT (OUTPATIENT)
Dept: GASTROENTEROLOGY | Facility: CLINIC | Age: 56
End: 2021-03-02
Payer: COMMERCIAL

## 2021-03-02 ENCOUNTER — OFFICE VISIT (OUTPATIENT)
Dept: ENDOCRINOLOGY | Facility: CLINIC | Age: 56
End: 2021-03-02
Payer: COMMERCIAL

## 2021-03-02 VITALS
HEIGHT: 65 IN | HEART RATE: 70 BPM | BODY MASS INDEX: 34.59 KG/M2 | DIASTOLIC BLOOD PRESSURE: 84 MMHG | WEIGHT: 207.6 LBS | SYSTOLIC BLOOD PRESSURE: 110 MMHG

## 2021-03-02 VITALS
SYSTOLIC BLOOD PRESSURE: 112 MMHG | HEART RATE: 81 BPM | HEIGHT: 65 IN | BODY MASS INDEX: 33.92 KG/M2 | DIASTOLIC BLOOD PRESSURE: 68 MMHG | WEIGHT: 203.6 LBS

## 2021-03-02 DIAGNOSIS — E78.5 HYPERLIPIDEMIA, UNSPECIFIED HYPERLIPIDEMIA TYPE: ICD-10-CM

## 2021-03-02 DIAGNOSIS — E55.9 VITAMIN D DEFICIENCY: ICD-10-CM

## 2021-03-02 DIAGNOSIS — R73.03 PREDIABETES: Primary | ICD-10-CM

## 2021-03-02 DIAGNOSIS — E28.8 HYPERANDROGENISM: ICD-10-CM

## 2021-03-02 DIAGNOSIS — K58.2 IRRITABLE BOWEL SYNDROME WITH BOTH CONSTIPATION AND DIARRHEA: Primary | ICD-10-CM

## 2021-03-02 DIAGNOSIS — K21.9 LARYNGOPHARYNGEAL REFLUX (LPR): ICD-10-CM

## 2021-03-02 PROCEDURE — 99214 OFFICE O/P EST MOD 30 MIN: CPT | Performed by: PHYSICIAN ASSISTANT

## 2021-03-02 PROCEDURE — 99214 OFFICE O/P EST MOD 30 MIN: CPT | Performed by: INTERNAL MEDICINE

## 2021-03-02 RX ORDER — AMITRIPTYLINE HYDROCHLORIDE 25 MG/1
25 TABLET, FILM COATED ORAL
Qty: 30 TABLET | Refills: 1 | Status: SHIPPED | OUTPATIENT
Start: 2021-03-02 | End: 2021-04-23 | Stop reason: SDUPTHER

## 2021-03-02 NOTE — PROGRESS NOTES
Manoj Chick 61 y o  female MRN: [id number]    Encounter: [id number]      Assessment/Plan     Problem List Items Addressed This Visit        Endocrine    Hyperandrogenism     Continue spironolactone         Relevant Orders    Testosterone, free, total Lab Collect    DHEA-sulfate Lab Collect       Other    Hyperlipidemia    Relevant Orders    Lipid Panel with Direct LDL reflex Lab Collect    Prediabetes - Primary     Focus on dietary and lifestyle modifications and weight loss         Relevant Orders    Comprehensive metabolic panel Lab Collect    HEMOGLOBIN A1C W/ EAG ESTIMATION Lab Collect    Vitamin D deficiency     Continue supplementations             CC: pre Diabetes    History of Present Illness     HPI:  41-year-old female with prediabetes, obesity, hyperlipidemia and hyperandrogenism here for follow-up  She recently Started on omeprazole and symptoms of globus sensation have improved     On saxenda for the past couple of years - weight has been fluctuating       Hirsutism improved     Review of Systems   Constitutional: Positive for fatigue  Negative for unexpected weight change  Respiratory: Negative for cough and shortness of breath  Cardiovascular: Negative for palpitations and leg swelling  Gastrointestinal: Positive for constipation and diarrhea  Negative for abdominal pain, nausea and vomiting  Endocrine: Negative for polydipsia and polyuria  Musculoskeletal: Negative for gait problem  Skin: Negative for wound  Psychiatric/Behavioral: Positive for sleep disturbance  All other systems reviewed and are negative        Historical Information   Past Medical History:   Diagnosis Date    Claustrophobia     Colon polyp     Hirsutism     Hyperandrogenism     Hyperglycemia     Hypothyroidism     last assessed 2/10/2017    Memory difficulties     Migraine headache     Premenopausal menorrhagia 01/10/2006    SAB (spontaneous )     Vitamin D deficiency     Word finding difficulty      Past Surgical History:   Procedure Laterality Date    CHOLECYSTECTOMY      COLONOSCOPY      EGD      HAND SURGERY      x2 Right hand - lesion excisions    HYSTEROSCOPY W/ ENDOMETRIAL ABLATION       Social History   Social History     Substance and Sexual Activity   Alcohol Use Yes    Frequency: 2-4 times a month    Drinks per session: 1 or 2    Binge frequency: Never    Comment: occasional     Social History     Substance and Sexual Activity   Drug Use No     Social History     Tobacco Use   Smoking Status Former Smoker    Packs/day: 0 25    Types: Cigarettes    Quit date: 2013    Years since quittin 7   Smokeless Tobacco Never Used     Family History:   Family History   Problem Relation Age of Onset    COPD Mother         severe    Alzheimer's disease Mother     Multiple sclerosis Mother     Depression Mother     Diabetes Father         DM    Heart disease Father         cardiac disorder     Hypertension Father     Kidney disease Maternal Grandfather     Diabetes Paternal Grandmother         DM    Thyroid disease Paternal Grandmother     Breast cancer Paternal Grandmother     Diabetes Paternal Grandfather         DM    Heart disease Paternal Grandfather         cardiac disorder     Diabetes Sister         DM    Thyroid disease Sister     Heart disease Maternal Grandmother         cardiac disorder     Hiatal hernia Maternal Grandmother     Brain cancer Son     Lymphoma Son     Kidney disease Family         renal disorder    No Known Problems Daughter     No Known Problems Sister     No Known Problems Half-Sister        Meds/Allergies   Current Outpatient Medications   Medication Sig Dispense Refill    Cholecalciferol (VITAMIN D3) 5000 units CAPS Take 1 capsule by mouth daily      dicyclomine (BENTYL) 20 mg tablet Take 1 tablet (20 mg total) by mouth every 6 (six) hours as needed (abdominal pain/cramping) 90 tablet 2    famotidine (PEPCID) 40 MG tablet Take 1 tablet (40 mg total) by mouth 2 (two) times a day as needed for heartburn 60 tablet 2    Insulin Pen Needle (BD Pen Needle Cuca U/F) 32G X 4 MM MISC Use 1 daily 100 each 1    liraglutide (Saxenda) injection Inject 0 5 mL (3 mg total) under the skin daily 15 mL 2    omeprazole (PriLOSEC) 40 MG capsule Take 1 capsule (40 mg total) by mouth daily 30 capsule 3    Probiotic Product ([employer]S ADV PROBIOTIC GUMMIES PO) Take by mouth      spironolactone (ALDACTONE) 50 mg tablet Take 1 tablet (50 mg total) by mouth 2 (two) times a day 180 tablet 0    amitriptyline (ELAVIL) 25 mg tablet Take 1 tablet (25 mg total) by mouth daily at bedtime 30 tablet 1    erythromycin (ILOTYCIN) ophthalmic ointment Administer 0 5 inches into the left eye daily at bedtime 3 5 g 0    ondansetron (ZOFRAN) 4 mg tablet Take 1 tablet (4 mg total) by mouth every 8 (eight) hours as needed for nausea or vomiting 20 tablet 0     No current facility-administered medications for this visit  No Known Allergies    Objective   Vitals: Blood pressure 110/84, pulse 70, height 5' 5" (1 651 m), weight 94 2 kg (207 lb 9 6 oz)  Physical Exam  Vitals signs reviewed  Constitutional:       Appearance: Normal appearance  She is obese  She is not ill-appearing or diaphoretic  HENT:      Head: Normocephalic and atraumatic  Eyes:      General: No scleral icterus  Extraocular Movements: Extraocular movements intact  Neck:      Musculoskeletal: Neck supple  Cardiovascular:      Rate and Rhythm: Normal rate and regular rhythm  Heart sounds: Normal heart sounds  No murmur  Pulmonary:      Effort: Pulmonary effort is normal  No respiratory distress  Breath sounds: Normal breath sounds  No wheezing or rales  Abdominal:      General: There is no distension  Palpations: Abdomen is soft  Tenderness: There is no abdominal tenderness  There is no guarding  Musculoskeletal:      Right lower leg: No edema        Left lower leg: No edema  Lymphadenopathy:      Cervical: No cervical adenopathy  Skin:     General: Skin is warm and dry  Neurological:      General: No focal deficit present  Mental Status: She is alert and oriented to person, place, and time  Psychiatric:         Mood and Affect: Mood normal          Behavior: Behavior normal          Thought Content: Thought content normal          Judgment: Judgment normal          The history was obtained from the review of the chart, patient  Lab Results:   Lab Results   Component Value Date/Time    Hemoglobin A1C 5 3 12/31/2020 10:02 AM    BUN 17 02/27/2021 07:29 AM    BUN 13 12/31/2020 10:02 AM    Potassium 4 6 02/27/2021 07:29 AM    Potassium 4 2 12/31/2020 10:02 AM    Chloride 105 02/27/2021 07:29 AM    Chloride 107 12/31/2020 10:02 AM    CO2 28 02/27/2021 07:29 AM    CO2 28 12/31/2020 10:02 AM    Creatinine 0 99 02/27/2021 07:29 AM    Creatinine 0 87 12/31/2020 10:02 AM    AST 13 02/27/2021 07:29 AM    AST 15 12/31/2020 10:02 AM    ALT 21 02/27/2021 07:29 AM    ALT 27 12/31/2020 10:02 AM    Albumin 4 1 02/27/2021 07:29 AM    Albumin 4 2 12/31/2020 10:02 AM    HDL, Direct 53 02/27/2021 07:29 AM    HDL, Direct 54 12/31/2020 10:02 AM    Triglycerides 199 (H) 02/27/2021 07:29 AM    Triglycerides 111 12/31/2020 10:02 AM         Portions of the record may have been created with voice recognition software  Occasional wrong word or "sound a like" substitutions may have occurred due to the inherent limitations of voice recognition software  Read the chart carefully and recognize, using context, where substitutions have occurred

## 2021-03-02 NOTE — PROGRESS NOTES
[facility]  Nely Pino 61 y o  female MRN: [id number]       CC: Follow-up    HPI: Giuseppe Dover is a 61year old female with history of hypothyroidism, type 2 DM, GERD and IBS  Patient is here to follow-up  She was last seen in the office in September  She reports that she is now following with ENT who discovered she has LPR  She was restarted on omeprazole in the AM and Pepcid before bed  She reports that she is going to follow-up with ENT today  In terms of her lower GI symptoms, she is complaining of alternating straining and diarrhea with abdominal discomfort  She denies overt GI bleeding  She is now working from home, but still with the Network  She does admit to increased stress  Patient's last EGD was with Dr Rajani Abel in June 2019  This was normal   She had colonoscopy in November 2019 revealing a rectal polyp that was removed  Otherwise, the remainder of the colon appeared to be normal   She is due 5 years  Review of Systems:    CONSTITUTIONAL: Denies any fever, chills, or rigors  Good appetite, and no recent weight loss  HEENT: No earache or tinnitus  Denies hearing loss or visual disturbances  CARDIOVASCULAR: No chest pain or palpitations  RESPIRATORY: Denies any cough, hemoptysis, shortness of breath or dyspnea on exertion  GASTROINTESTINAL: As noted in the History of Present Illness  GENITOURINARY: No problems with urination  Denies any hematuria or dysuria  NEUROLOGIC: No dizziness or vertigo, denies headaches  MUSCULOSKELETAL: Denies any muscle or joint pain  SKIN: Denies skin rashes or itching  ENDOCRINE: Denies excessive thirst  Denies intolerance to heat or cold  PSYCHOSOCIAL: Denies depression or anxiety  Denies any recent memory loss         Current Outpatient Medications   Medication Sig Dispense Refill    Cholecalciferol (VITAMIN D3) 5000 units CAPS Take 1 capsule by mouth daily      dicyclomine (BENTYL) 20 mg tablet Take 1 tablet (20 mg total) by mouth every 6 (six) hours as needed (abdominal pain/cramping) 90 tablet 2    erythromycin (ILOTYCIN) ophthalmic ointment Administer 0 5 inches into the left eye daily at bedtime 3 5 g 0    famotidine (PEPCID) 40 MG tablet Take 1 tablet (40 mg total) by mouth 2 (two) times a day as needed for heartburn 60 tablet 2    Insulin Pen Needle (BD Pen Needle Cuca U/F) 32G X 4 MM MISC Use 1 daily 100 each 1    liraglutide (Saxenda) injection Inject 0 5 mL (3 mg total) under the skin daily 15 mL 2    omeprazole (PriLOSEC) 40 MG capsule Take 1 capsule (40 mg total) by mouth daily 30 capsule 3    ondansetron (ZOFRAN) 4 mg tablet Take 1 tablet (4 mg total) by mouth every 8 (eight) hours as needed for nausea or vomiting 20 tablet 0    Probiotic Product (CVS ADV PROBIOTIC GUMMIES PO) Take by mouth      spironolactone (ALDACTONE) 50 mg tablet Take 1 tablet (50 mg total) by mouth 2 (two) times a day 180 tablet 0    amitriptyline (ELAVIL) 25 mg tablet Take 1 tablet (25 mg total) by mouth daily at bedtime 30 tablet 1     No current facility-administered medications for this visit        Past Medical History:   Diagnosis Date    Claustrophobia     Colon polyp     Hirsutism     Hyperandrogenism     Hyperglycemia     Hypothyroidism     last assessed 2/10/2017    Memory difficulties     Migraine headache     Premenopausal menorrhagia 01/10/2006    SAB (spontaneous )     Vitamin D deficiency     Word finding difficulty      Past Surgical History:   Procedure Laterality Date    CHOLECYSTECTOMY      COLONOSCOPY      EGD      HAND SURGERY      x2 Right hand - lesion excisions    HYSTEROSCOPY W/ ENDOMETRIAL ABLATION       Social History     Socioeconomic History    Marital status: /Civil Union     Spouse name: None    Number of children: None    Years of education: None    Highest education level: None   Occupational History    Occupation: cardiology tech     Employer: [employer] [address] Financial resource strain: None    Food insecurity     Worry: None     Inability: None    Transportation needs     Medical: None     Non-medical: None   Tobacco Use    Smoking status: Former Smoker     Packs/day: 0 25     Types: Cigarettes     Quit date: 2013     Years since quittin 7    Smokeless tobacco: Never Used   Substance and Sexual Activity    Alcohol use: Yes     Frequency: 2-4 times a month     Drinks per session: 1 or 2     Binge frequency: Never     Comment: occasional    Drug use: No    Sexual activity: Yes     Partners: Male   Lifestyle    Physical activity     Days per week: 0 days     Minutes per session: 0 min    Stress: Not at all   Relationships    Social connections     Talks on phone: None     Gets together: None     Attends [employer] service: None     Active member of club or organization: None     Attends meetings of clubs or organizations: None     Relationship status: None    Intimate partner violence     Fear of current or ex partner: None     Emotionally abused: None     Physically abused: None     Forced sexual activity: None   Other Topics Concern    None   Social History Narrative    Daily caffeine consumption     Family History   Problem Relation Age of Onset    COPD Mother         severe    Alzheimer's disease Mother     Multiple sclerosis Mother     Depression Mother     Diabetes Father         DM    Heart disease Father         cardiac disorder     Hypertension Father     Kidney disease Maternal Grandfather     Diabetes Paternal Grandmother         DM    Thyroid disease Paternal Grandmother     Breast cancer Paternal Grandmother     Diabetes Paternal Grandfather         DM    Heart disease Paternal Grandfather         cardiac disorder     Diabetes Sister         DM    Thyroid disease Sister     Heart disease Maternal Grandmother         cardiac disorder     Hiatal hernia Maternal Grandmother     Brain cancer Son     Lymphoma Son     Kidney disease Family         renal disorder    No Known Problems Daughter     No Known Problems Sister     No Known Problems Half-Sister             PHYSICAL EXAM:    Vitals:    03/02/21 1119   BP: 112/68   Pulse: 81   Weight: 92 4 kg (203 lb 9 6 oz)   Height: 5' 5" (1 651 m)     General Appearance:   Alert and oriented x 3  Cooperative, and in no respiratory distress   HEENT:   Normocephalic, atraumatic, anicteric      Neck:  Supple, symmetrical, trachea midline   Lungs:   Clear to auscultation bilaterally    Heart[de-identified]   Regular rate and rhythm   Abdomen:   Soft, non-tender, non-distended; normal bowel sounds; no masses, no organomegaly    Genitalia:   Deferred    Rectal:   Deferred    Extremities:  No cyanosis, clubbing or edema    Pulses:  2+ and symmetric all extremities    Skin:  Skin color, texture, turgor normal, no rashes or lesions    Lymph nodes:  No palpable cervical or supraclavicular lymphadenopathy        Lab Results:       Results from last 6 Months   Lab Units 02/27/21  0729   POTASSIUM mmol/L 4 6   CHLORIDE mmol/L 105   CO2 mmol/L 28   BUN mg/dL 17   CREATININE mg/dL 0 99   CALCIUM mg/dL 9 4   ALK PHOS U/L 78   ALT U/L 21   AST U/L 13               Imaging Studies: I have personally reviewed pertinent imaging studies  No results found  ASSESSMENT and PLAN:      1) IBS mixed - Advised to increase fiber supplement  She is following with nutrition as well  Instead of Bentyl, we will start her on low dose Elavil before bedtime for chronic abdominal discomfort  2) LPR - Continue omeprazole and famotidine as recommended by ENT  Agree with their recommendations  Follow up in 4-6 weeks

## 2021-03-08 ENCOUNTER — TELEPHONE (OUTPATIENT)
Dept: GASTROENTEROLOGY | Facility: CLINIC | Age: 56
End: 2021-03-08

## 2021-03-08 NOTE — TELEPHONE ENCOUNTER
----- Message from Selvin Vargas sent at 3/8/2021 12:30 PM EST -----  Regarding: Visit Follow-Up Question  Contact: [phone]  I just wanted you to know that Dr Praveena Antony took my off Omeprazole and made it PRN for now  His goal is to have me off it completely  I've been meaning to let you know, but kept forgetting until today  I just though you should know

## 2021-03-22 ENCOUNTER — CLINICAL SUPPORT (OUTPATIENT)
Dept: NUTRITION | Facility: HOSPITAL | Age: 56
End: 2021-03-22
Payer: COMMERCIAL

## 2021-03-22 VITALS — BODY MASS INDEX: 33.45 KG/M2 | WEIGHT: 201 LBS

## 2021-03-22 DIAGNOSIS — E66.9 OBESITY (BMI 30-39.9): ICD-10-CM

## 2021-03-22 PROCEDURE — 97803 MED NUTRITION INDIV SUBSEQ: CPT | Performed by: DIETITIAN, REGISTERED

## 2021-03-22 NOTE — PROGRESS NOTES
Follow-Up Nutrition Assessment Form    Patient Name: Sallie Britton    YOB: 1965    Sex: Female      Follow Up Date: 3/22/2021  Start Time: 10A Stop Time: 10:30 Total Minutes: 30     Data:  Present at session: self   Parent/Patient Concerns:  "things have been going well, but I slipped up some days"   Medical Dx/Reason for Referral:  E66 09 Obesity   Past Medical History:   Diagnosis Date    Claustrophobia     Colon polyp     Hirsutism     Hyperandrogenism     Hyperglycemia     Hypothyroidism     last assessed 2/10/2017    Memory difficulties     Migraine headache     Premenopausal menorrhagia 01/10/2006    SAB (spontaneous )     Vitamin D deficiency     Word finding difficulty        Current Outpatient Medications   Medication Sig Dispense Refill    amitriptyline (ELAVIL) 25 mg tablet Take 1 tablet (25 mg total) by mouth daily at bedtime 30 tablet 1    Cholecalciferol (VITAMIN D3) 5000 units CAPS Take 1 capsule by mouth daily      dicyclomine (BENTYL) 20 mg tablet Take 1 tablet (20 mg total) by mouth every 6 (six) hours as needed (abdominal pain/cramping) 90 tablet 2    erythromycin (ILOTYCIN) ophthalmic ointment Administer 0 5 inches into the left eye daily at bedtime 3 5 g 0    famotidine (PEPCID) 40 MG tablet Take 1 tablet (40 mg total) by mouth 2 (two) times a day as needed for heartburn 60 tablet 2    Insulin Pen Needle (BD Pen Needle Cuca U/F) 32G X 4 MM MISC Use 1 daily 100 each 1    liraglutide (Saxenda) injection Inject 0 5 mL (3 mg total) under the skin daily 15 mL 2    omeprazole (PriLOSEC) 40 MG capsule Take 1 capsule (40 mg total) by mouth daily 30 capsule 3    ondansetron (ZOFRAN) 4 mg tablet Take 1 tablet (4 mg total) by mouth every 8 (eight) hours as needed for nausea or vomiting 20 tablet 0    Probiotic Product (CVS ADV PROBIOTIC GUMMIES PO) Take by mouth      spironolactone (ALDACTONE) 50 mg tablet Take 1 tablet (50 mg total) by mouth 2 (two) times a day 180 tablet 0     No current facility-administered medications for this visit  Additional Meds/Supplements:  none   Barriers to Learning: None   Labs: No new labs to review   Height: Ht Readings from Last 3 Encounters:   03/02/21 5' 5" (1 651 m)   03/02/21 5' 5" (1 651 m)   03/02/21 5' 5" (1 651 m)      Weight: Wt Readings from Last 10 Encounters:   03/22/21 91 2 kg (201 lb)   03/02/21 92 1 kg (203 lb)   03/02/21 92 4 kg (203 lb 9 6 oz)   03/02/21 94 2 kg (207 lb 9 6 oz)   02/08/21 92 1 kg (203 lb)   01/25/21 90 7 kg (200 lb)   10/21/20 91 5 kg (201 lb 12 8 oz)   09/02/20 90 3 kg (199 lb)   03/02/20 89 kg (196 lb 3 2 oz)   12/02/19 85 6 kg (188 lb 11 4 oz)     Estimated body mass index is 33 45 kg/m² as calculated from the following:    Height as of 3/2/21: 5' 5" (1 651 m)  Weight as of this encounter: 91 2 kg (201 lb)  Wt  Change Since Last Visit: []Yes     []No  Amount:       Energy Needs: [address] Equation:  [phone]   Pain Screen: Are you having pain now? No      Previous Goals:  1  Maisie Closs will facilitate weight loss by achieving 5% loss by next follow up in 6 weeks (~10# loss, aiming for at least 5#)   2  Maisie Closs will increase her exercise efforts by aiming to complete 150 minutes of  Moderate exercise per week   3  Maisie Closs will increase her vegetable consumption at each meal by aiming to include at least 1/2 cup vegetables at meals  4  Maisie Closs will maintain 1240-8334 calorie intake by tracking calories on an [employer] (MyFitnessPal, LoseIt, etc)         New Goals:   1  Maisie Closs will maintain 1483-3454 calorie intake by tracking calories on an [employer] (MyFitnessPal, LoseIt, etc)   2  Maisie Closs will increase her exercise efforts by aiming to complete 150 minutes of  Moderate exercise per week   3   Maisie Closs will continue her mindfulness meditation practices and will incorporate mindfulness into her eating habits to reduce stress eating and any feelings of guilt that arise after "bad" days       Initial PES: Overweight/obesity  related to Excess energy intake, physical inactivity as evidenced by  BMI more than normative standard for age and sex (obesity-grade I 26-30  9)   New PES: No Change      Assessment:  Aydin Maria presents today to review her weight loss efforts  She has facilitated behavior changes in a positive manner; she has increased her vegetable portions at dinner meal, and has decreased her CHO portions  She reports she has been more mindful about "what I put in my mouth", and therefore has been making healthier choices  She does not gravitate towards high calorie, high fat snacks any more  She has been choosing mixed nuts, apples, bananas, yogurt  Snacks mid-afternoon and before 7:30  Does not eat later than 7:30  Her weight has not necessarily decreased however it is stable around 201# as before  She has been moving more often at home, but exercise itself has not necessarily increased  She will take more steps up and down stairs when doing laundry and house work, plans to increase outside activity with walking her dog, riding bike, yard work now that the weather is warmer  Aydin Maria reports that her biggest barrier for change is herself  She feels as though we are on the same page and have a good game plan for her weight loss goals, however she feels she has not made enough of an effort to facilitate this change  She reports her daughter has been a great motivator and confidante in her weight loss struggles, as she will feel guilty and "hung-up" on the days where she slips and has a "bad day"        Medical Nutrition Therapy Intervention:  [x]Individualized Meal Plan:8139-6827 calorie meal plan; 60-75g protein; 30-35g fiber []Understanding Lab Values   []Basic Pathophysiology of Disease []Food/Medication Interactions   [x]Food Diary: reviewed current food recall from past 3 days; recommended to continue food dairy daily for accountability [x]Exercise: Recommend 150 minutes of moderate intensity exercise per week (or 30 minutes of moderate intensity exercise x5 days per week); utilize YouTube for free workout videos instead of going to gym during [place]  ; plans to increase her time spent outside gardening, walking the dog, riding her bike, etc     [x]Lifestyle/Behavior Modification Techniques: Reviewed WFPB diet: Choose fiber-filled, nutrient-dense whole plant foods to fill half to 3/4 of your plate, including Vegetables and fruits, Legumes, whole grains, nuts/seeds; Limit/avoid sugary drinks/beverages, processed meats and snacks, red meats/poultry/eggs/high-fat dairy    Mindful eating techniques: slowing down rate of eating to 30 minutes per meal; waiting 20 minutes and drinking a glass of water before getting 2nd helping of food item; focus on protein source and vegetable when getting 2nd helping vs carbohydrate  []Medication, Mechanism of Action   [x]Label Reading: >3g fiber per serving; <350mg sodium per serving []Self Blood Glucose Monitoring   []Weight/BMI Goals: pt's goal weight is between 140-150#  No set timeframe to achieve this goal  Aiming for at least a 5# weight loss by next follow up in 6 weeks, goal to achieve is a 5% loss (~10#)    []Other -    Other Notes:        Comprehension: []Excellent  []Very Good  [x]Good  []Fair   []Poor    Receptivity: []Excellent  []Very Good  [x]Good  []Fair   []Poor    Expected Compliance: []Excellent  []Very Good  [x]Good  []Fair   []Poor      Labs:  CMP  Lab Results   Component Value Date    K 4 6 02/27/2021     02/27/2021    CO2 28 02/27/2021    BUN 17 02/27/2021    CREATININE 0 99 02/27/2021    GLUF 92 02/27/2021    CALCIUM 9 4 02/27/2021    AST 13 02/27/2021    ALT 21 02/27/2021    ALKPHOS 78 02/27/2021    EGFR 63 02/27/2021       BMP  Lab Results   Component Value Date    CALCIUM 9 4 02/27/2021    K 4 6 02/27/2021    CO2 28 02/27/2021     02/27/2021    BUN 17 02/27/2021    CREATININE 0 99 02/27/2021       Lipids  Lab Results   Component Value Date    CHOL 193 08/11/2015    CHOL 175 06/06/2014     Lab Results   Component Value Date    HDL 53 02/27/2021    HDL 54 12/31/2020    HDL 48 08/21/2019     Lab Results   Component Value Date    LDLCALC 116 (H) 02/27/2021    LDLCALC 164 (H) 12/31/2020    LDLCALC 120 (H) 08/21/2019     Lab Results   Component Value Date    TRIG 199 (H) 02/27/2021    TRIG 111 12/31/2020    TRIG 149 08/21/2019     No results found for: CHOLHDL    Hemoglobin A1C  Lab Results   Component Value Date    HGBA1C 5 3 12/31/2020       Fasting Glucose  Lab Results   Component Value Date    GLUF 92 02/27/2021       Insulin     Thyroid  No results found for: TSH, N4SXQDD, P2IBKAD, THYROIDAB    Hepatic Function Panel  Lab Results   Component Value Date    ALT 21 02/27/2021    AST 13 02/27/2021    ALKPHOS 78 02/27/2021       Celiac Disease Antibody Panel  No results found for: ENDOMYSIAL IGA, GLIADIN IGA, GLIADIN IGG, IGA, TISSUE TRANSGLUT AB, TTG IGA   Iron  No results found for: IRON, TIBC, FERRITIN    Vitamins  No results found for: VITAMIN B2   No results found for: NICOTINAMIDE, NICOTINIC ACID   No results found for: VITAMINB6  Lab Results   Component Value Date    WPOQSVDW43 353 12/15/2017     No results found for: VITB5  No results found for: T9DIZUHO  No results found for: THYROGLB  No results found for: VITAMIN K   No results found for: 25-HYDROXY VIT D   No components found for: VITAMINE     No follow-ups on file  Renee Keene MS, RD, DipACLM, LDN  Clinical Nutrition   [facility]  [email or web address]  [address]  [address] [address]

## 2021-04-14 ENCOUNTER — TELEMEDICINE (OUTPATIENT)
Dept: GASTROENTEROLOGY | Facility: CLINIC | Age: 56
End: 2021-04-14
Payer: COMMERCIAL

## 2021-04-14 DIAGNOSIS — Z79.899 MEDICATION DOSE CHANGED: Primary | ICD-10-CM

## 2021-04-14 DIAGNOSIS — K58.8 OTHER IRRITABLE BOWEL SYNDROME: ICD-10-CM

## 2021-04-14 PROCEDURE — 99442 PR PHYS/QHP TELEPHONE EVALUATION 11-20 MIN: CPT | Performed by: PHYSICIAN ASSISTANT

## 2021-04-14 NOTE — PROGRESS NOTES
Virtual Brief Visit    Reason for visit is for  follow-up for recent medication change  Encounter provider Hannah Alonso PA-C    Provider located at [address] [facility] [address] [facility] [address] [phone]    Recent Visits  No visits were found meeting these conditions  Showing recent visits within past 7 days and meeting all other requirements     Future Appointments  No visits were found meeting these conditions  Showing future appointments within next 150 days and meeting all other requirements        After connecting through telephone, the patient was identified by name and date of birth  Kilo Lucas was informed that this is a telemedicine visit and that the visit is being conducted through telephone  My office door was closed  No one else was in the room  She acknowledged consent and understanding of privacy and security of the platform  The patient has agreed to participate and understands she can discontinue the visit at any time  Patient is aware this is a billable service  Subjective    Kilo Lucas is a 61 y o  female   With history of hypothyroidism, IBS, and LPR  Patient presents via telephone visit to follow-up after I adjusted her medications at our last visit  She is also following with ENT for LPR  She is maintained on omeprazole and Pepcid  She reports that she has been doing well from that standpoint, and sleeping on a wedge pillow  She reports that she only has breakthrough symptoms when she eats spicy foods  S patient has chronic abdominal pain, we decided to start her on Elavil  She reports that she has been doing very well and feels that she is seeing a difference while on this medication  She is also sleeping better      Past Medical History:   Diagnosis Date    Claustrophobia     Colon polyp     Hirsutism     Hyperandrogenism     Hyperglycemia     Hypothyroidism     last assessed 2/10/2017    Memory difficulties     Migraine headache     Premenopausal menorrhagia 01/10/2006    SAB (spontaneous )     Vitamin D deficiency     Word finding difficulty        Past Surgical History:   Procedure Laterality Date    CHOLECYSTECTOMY      COLONOSCOPY      EGD      HAND SURGERY      x2 Right hand - lesion excisions    HYSTEROSCOPY W/ ENDOMETRIAL ABLATION         Current Outpatient Medications   Medication Sig Dispense Refill    amitriptyline (ELAVIL) 25 mg tablet Take 1 tablet (25 mg total) by mouth daily at bedtime 30 tablet 1    Cholecalciferol (VITAMIN D3) 5000 units CAPS Take 1 capsule by mouth daily      dicyclomine (BENTYL) 20 mg tablet Take 1 tablet (20 mg total) by mouth every 6 (six) hours as needed (abdominal pain/cramping) 90 tablet 2    erythromycin (ILOTYCIN) ophthalmic ointment Administer 0 5 inches into the left eye daily at bedtime 3 5 g 0    famotidine (PEPCID) 40 MG tablet Take 1 tablet (40 mg total) by mouth 2 (two) times a day as needed for heartburn 60 tablet 2    Insulin Pen Needle (BD Pen Needle Cuca U/F) 32G X 4 MM MISC Use 1 daily 100 each 1    liraglutide (Saxenda) injection Inject 0 5 mL (3 mg total) under the skin daily 15 mL 2    omeprazole (PriLOSEC) 40 MG capsule Take 1 capsule (40 mg total) by mouth daily 30 capsule 3    ondansetron (ZOFRAN) 4 mg tablet Take 1 tablet (4 mg total) by mouth every 8 (eight) hours as needed for nausea or vomiting 20 tablet 0    Probiotic Product (CVS ADV PROBIOTIC GUMMIES PO) Take by mouth      spironolactone (ALDACTONE) 50 mg tablet Take 1 tablet (50 mg total) by mouth 2 (two) times a day 180 tablet 0     No current facility-administered medications for this visit  No Known Allergies    Review of Systems REVIEW OF SYSTEMS:    CONSTITUTIONAL: Denies any fever, chills, rigors, and weight loss  HEENT: No earache or tinnitus  Denies hearing loss or visual disturbances  CARDIOVASCULAR: No chest pain or palpitations  RESPIRATORY: Denies any cough, hemoptysis, shortness of breath or dyspnea on exertion  GASTROINTESTINAL: As noted in the History of Present Illness  GENITOURINARY: No problems with urination  Denies any hematuria or dysuria  NEUROLOGIC: No dizziness or vertigo, denies headaches  MUSCULOSKELETAL: Denies any muscle or joint pain  SKIN: Denies skin rashes or itching  ENDOCRINE: Denies excessive thirst  Denies intolerance to heat or cold  PSYCHOSOCIAL: Denies depression or anxiety  Denies any recent memory loss  PLAN:    1) LPR -   Stable, following with ENT  2) IBS, mixed -  Patient reports that she does see Elavil in terms of her abdominal discomfort  We will continue current dose  She will get EKG to make sure that her QT interval is stable  I spent 15 minutes directly with the patient during this visit    VIRTUAL VISIT DISCLAIMER    Mihir Ayala acknowledges that she has consented to an online visit or consultation  She understands that the online visit is based solely on information provided by her, and that, in the absence of a face-to-face physical evaluation by the physician, the diagnosis she receives is both limited and provisional in terms of accuracy and completeness  This is not intended to replace a full medical face-to-face evaluation by the physician  Mihir Ayala understands and accepts these terms

## 2021-04-20 ENCOUNTER — TELEPHONE (OUTPATIENT)
Dept: ENDOCRINOLOGY | Facility: CLINIC | Age: 56
End: 2021-04-20

## 2021-04-20 NOTE — TELEPHONE ENCOUNTER
Rosemarie Velazquez (Key: [id number])  Rx #: [id number]  Saxenda 18MG/3ML pen-injectors     Form  [facility] Prescription Drug Prior Authorization Form   Plan Contact  [phone] phone  [phone] fax

## 2021-04-23 DIAGNOSIS — K58.2 IRRITABLE BOWEL SYNDROME WITH BOTH CONSTIPATION AND DIARRHEA: ICD-10-CM

## 2021-04-23 RX ORDER — AMITRIPTYLINE HYDROCHLORIDE 25 MG/1
25 TABLET, FILM COATED ORAL
Qty: 30 TABLET | Refills: 1 | Status: SHIPPED | OUTPATIENT
Start: 2021-04-23 | End: 2021-07-14 | Stop reason: SDUPTHER

## 2021-04-23 NOTE — TELEPHONE ENCOUNTER
----- Message from Akira Rand sent at 4/23/2021  8:08 AM EDT -----  Regarding: Prescription Question  Contact: [phone]  amitriptyline 25 mg tablet    Commonly known as: ELAVIL    The [employer] system said I need to ask for a refill this way  I have about a week left on the script we started  Would you be able to call in a refill soon to [facility]? I would appreciate it  Thank you

## 2021-04-27 NOTE — TELEPHONE ENCOUNTER
[facility] called for additional info in order to process the PA  Answered clinical info for the phone  PA approved, but only for 4 months      PA approved, PA # [id number], Eff 4/27/21 - 8/27/21

## 2021-04-28 ENCOUNTER — TELEPHONE (OUTPATIENT)
Dept: ENDOCRINOLOGY | Facility: CLINIC | Age: 56
End: 2021-04-28

## 2021-04-29 ENCOUNTER — TELEPHONE (OUTPATIENT)
Dept: GASTROENTEROLOGY | Facility: CLINIC | Age: 56
End: 2021-04-29

## 2021-04-29 NOTE — TELEPHONE ENCOUNTER
Also a patient message:  I need bloodwork for titres for rubella  rubeola, varicella, Hep B  Also, do you get my TB results? If there is a test for that please order as well  I'm looking to get these done ASAP  I appreciate your help in this matter  Thank you        Routing to pa  Please advise

## 2021-04-29 NOTE — TELEPHONE ENCOUNTER
Clemencia Hood - patient called requested that we put in new blood work orders for patient   Please call Steff Anne when entered at [phone] ty

## 2021-04-29 NOTE — TELEPHONE ENCOUNTER
----- Message from Odilon Gray sent at 4/27/2021  4:04 PM EDT -----  Regarding: Non-Urgent Medical Question  Contact: [phone]  I need bloodwork for titres for rubella  rubeola, varicella, Hep B  Also, do you get my TB results? If there is a test for that please order as well  I'm looking to get these done ASAP  I appreciate your help in this matter  Thank you

## 2021-04-30 DIAGNOSIS — Z01.84 IMMUNITY TO MEASLES, MUMPS, AND RUBELLA DETERMINED BY SEROLOGIC TEST: Primary | ICD-10-CM

## 2021-04-30 DIAGNOSIS — Z01.84 IMMUNITY TO VARICELLA DETERMINED BY SEROLOGIC TEST: ICD-10-CM

## 2021-04-30 DIAGNOSIS — Z11.1 TUBERCULOSIS SCREENING: ICD-10-CM

## 2021-04-30 DIAGNOSIS — Z01.84 IMMUNITY TO HEPATITIS B VIRUS DEMONSTRATED BY SEROLOGIC TEST: ICD-10-CM

## 2021-05-01 ENCOUNTER — APPOINTMENT (OUTPATIENT)
Dept: LAB | Facility: CLINIC | Age: 56
End: 2021-05-01
Payer: COMMERCIAL

## 2021-05-01 DIAGNOSIS — Z11.1 TUBERCULOSIS SCREENING: ICD-10-CM

## 2021-05-01 DIAGNOSIS — Z01.84 IMMUNITY TO HEPATITIS B VIRUS DEMONSTRATED BY SEROLOGIC TEST: ICD-10-CM

## 2021-05-01 DIAGNOSIS — Z01.84 IMMUNITY TO VARICELLA DETERMINED BY SEROLOGIC TEST: ICD-10-CM

## 2021-05-01 DIAGNOSIS — Z01.84 IMMUNITY TO MEASLES, MUMPS, AND RUBELLA DETERMINED BY SEROLOGIC TEST: ICD-10-CM

## 2021-05-01 LAB
HBV SURFACE AB SER-ACNC: 28.91 MIU/ML
RUBV IGG SERPL IA-ACNC: 71 IU/ML

## 2021-05-01 PROCEDURE — 86765 RUBEOLA ANTIBODY: CPT

## 2021-05-01 PROCEDURE — 86706 HEP B SURFACE ANTIBODY: CPT

## 2021-05-01 PROCEDURE — 36415 COLL VENOUS BLD VENIPUNCTURE: CPT

## 2021-05-01 PROCEDURE — 86762 RUBELLA ANTIBODY: CPT

## 2021-05-01 PROCEDURE — 86480 TB TEST CELL IMMUN MEASURE: CPT

## 2021-05-01 PROCEDURE — 86735 MUMPS ANTIBODY: CPT

## 2021-05-01 PROCEDURE — 86787 VARICELLA-ZOSTER ANTIBODY: CPT

## 2021-05-03 LAB
GAMMA INTERFERON BACKGROUND BLD IA-ACNC: 0.1 IU/ML
M TB IFN-G BLD-IMP: NEGATIVE
M TB IFN-G CD4+ BCKGRND COR BLD-ACNC: 0.02 IU/ML
M TB IFN-G CD4+ BCKGRND COR BLD-ACNC: 0.08 IU/ML
MITOGEN IGNF BCKGRD COR BLD-ACNC: >10 IU/ML

## 2021-05-04 ENCOUNTER — TELEPHONE (OUTPATIENT)
Dept: INTERNAL MEDICINE CLINIC | Facility: CLINIC | Age: 56
End: 2021-05-04

## 2021-05-04 LAB
MEV IGG SER QL: NORMAL
MUV IGG SER QL: NORMAL
VZV IGG SER IA-ACNC: NORMAL

## 2021-05-04 NOTE — TELEPHONE ENCOUNTER
----- Message from Jagdish Hanna MD sent at 5/4/2021  2:09 PM EDT -----  Immune to MMR, hepatitis-B and varicella    tuberculosis test negative

## 2021-05-10 ENCOUNTER — CLINICAL SUPPORT (OUTPATIENT)
Dept: NUTRITION | Facility: HOSPITAL | Age: 56
End: 2021-05-10
Payer: COMMERCIAL

## 2021-05-10 VITALS — WEIGHT: 195 LBS | BODY MASS INDEX: 32.45 KG/M2

## 2021-05-10 DIAGNOSIS — E66.9 OBESITY (BMI 30-39.9): ICD-10-CM

## 2021-05-10 PROCEDURE — 97803 MED NUTRITION INDIV SUBSEQ: CPT | Performed by: DIETITIAN, REGISTERED

## 2021-05-10 NOTE — PROGRESS NOTES
Follow-Up Nutrition Assessment Form    Patient Name: Galindo Leger    YOB: 1965    Sex: Female      Follow Up Date: 5/10/2021  Start Time: 9:55A Stop Time: 10:15 Total Minutes: 20     Data:  Present at session: self   Parent/Patient Concerns:  "overall things are going well"   Medical Dx/Reason for Referral:  E66 09 Obesity   Past Medical History:   Diagnosis Date    Claustrophobia     Colon polyp     Hirsutism     Hyperandrogenism     Hyperglycemia     Hypothyroidism     last assessed 2/10/2017    Memory difficulties     Migraine headache     Premenopausal menorrhagia 01/10/2006    SAB (spontaneous )     Vitamin D deficiency     Word finding difficulty        Current Outpatient Medications   Medication Sig Dispense Refill    amitriptyline (ELAVIL) 25 mg tablet Take 1 tablet (25 mg total) by mouth daily at bedtime 30 tablet 1    Cholecalciferol (VITAMIN D3) 5000 units CAPS Take 1 capsule by mouth daily      dicyclomine (BENTYL) 20 mg tablet Take 1 tablet (20 mg total) by mouth every 6 (six) hours as needed (abdominal pain/cramping) 90 tablet 2    erythromycin (ILOTYCIN) ophthalmic ointment Administer 0 5 inches into the left eye daily at bedtime 3 5 g 0    famotidine (PEPCID) 40 MG tablet Take 1 tablet (40 mg total) by mouth 2 (two) times a day as needed for heartburn 60 tablet 2    Insulin Pen Needle (BD Pen Needle Cuca U/F) 32G X 4 MM MISC Use 1 daily 100 each 1    liraglutide (Saxenda) injection Inject 0 5 mL (3 mg total) under the skin daily 15 mL 2    omeprazole (PriLOSEC) 40 MG capsule Take 1 capsule (40 mg total) by mouth daily 30 capsule 3    ondansetron (ZOFRAN) 4 mg tablet Take 1 tablet (4 mg total) by mouth every 8 (eight) hours as needed for nausea or vomiting 20 tablet 0    Probiotic Product ([employer] ADV PROBIOTIC GUMMIES PO) Take by mouth      spironolactone (ALDACTONE) 50 mg tablet Take 1 tablet (50 mg total) by mouth 2 (two) times a day 180 tablet 0     No current facility-administered medications for this visit  Additional Meds/Supplements:  none   Barriers to Learning: None   Labs: No new labs to review   Height: Ht Readings from Last 3 Encounters:   03/02/21 5' 5" (1 651 m)   03/02/21 5' 5" (1 651 m)   03/02/21 5' 5" (1 651 m)      Weight: Wt Readings from Last 10 Encounters:   05/10/21 88 5 kg (195 lb)   03/22/21 91 2 kg (201 lb)   03/02/21 92 1 kg (203 lb)   03/02/21 92 4 kg (203 lb 9 6 oz)   03/02/21 94 2 kg (207 lb 9 6 oz)   02/08/21 92 1 kg (203 lb)   01/25/21 90 7 kg (200 lb)   10/21/20 91 5 kg (201 lb 12 8 oz)   09/02/20 90 3 kg (199 lb)   03/02/20 89 kg (196 lb 3 2 oz)     Estimated body mass index is 32 45 kg/m² as calculated from the following:    Height as of 3/2/21: 5' 5" (1 651 m)  Weight as of this encounter: 88 5 kg (195 lb)  Wt  Change Since Last Visit: [x]Yes     []No  Amount:  has been contuning her weight loss efforts, is now down to 195# on her scale at home consistently  In office her weight was 209#  Pt reports that she frequently weighs different amounts on [facility] scales  Will attempt to get scale re-calibrated for accuracy      Energy Needs: [address] [facility] Equation:  [id number]   Pain Screen: Are you having pain now? No      Previous Goals:  1  Emeka Vasquez will maintain 1613-9845 calorie intake by tracking calories on an [employer] ([employer] ([employer]Pal, LoseIt, etc)   2  Emeka Vasquez will increase her exercise efforts by aiming to complete 150 minutes of  Moderate exercise per week   3  Emeka Vasquez will continue her mindfulness meditation practices and will incorporate mindfulness into her eating habits to reduce stress eating and any feelings of guilt that arise after "bad" days         New Goals: Goals remain the same as pt is making progress with weight loss at this time  1  Emeka Vasquez will maintain 3388-5168 calorie intake by tracking calories on an [employer] (MyFitnessPal, LoseIt, etc)   2   Emeka Vasquez will increase her exercise efforts by aiming to complete 150 minutes of  Moderate exercise per week   3  Steff Anne will continue her mindfulness meditation practices and will incorporate mindfulness into her eating habits to reduce stress eating and any feelings of guilt that arise after "bad" days       Initial PES:    Overweight/obesity  related to Excess energy intake, physical inactivity as evidenced by  BMI more than normative standard for age and sex (obesity-grade I 26-30  9)   New PES: No Change      Assessment:  Steff Anne presents today to review her weight loss efforts  She has facilitated behavior changes in a positive manner; she has increased her vegetable portions at dinner meal, and has decreased her CHO portions  She reports she has been more mindful about "what I put in my mouth", and therefore has been making healthier choices  She does not gravitate towards high calorie, high fat snacks any more  She has been choosing mixed nuts, apples, bananas, yogurt, celery and carrot sticks, sometimes with tomatoes (grabs these and brings them to her desk for working at home instead of grabbing potato chips, etc) Snacks mid-afternoon and before 7:30  Does not eat later than 7:30  Her weight has gone down to 193-195# at home on her scale  She weighs herself every Friday morning, first thing in the morning  Today her weight in the office was 209#, which is quite a large difference compared to her at home scale number  She has been moving more often at home, but exercise itself has not necessarily increased  She will take more steps up and down stairs when doing laundry and house work, plans to increase outside activity with walking her dog, riding bike, yard work now that the weather is warmer  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 which entails quite a bit of walking up and down hills as well as multiple sets of stairs   Reviewed paying attention to her hunger cues throughout the day, and suggested we play with the feelings of hunger for more pleasure from meals, and to learn how to bridgett her hunger levels overall  She reports that she will put her hunger on the backburner during working hours, and once she is off of work, that is when she will become aware of her hunger level  Also she noted that sometimes during the work day, she will work through lunch because her department is currently short staffed  Medical Nutrition Therapy Intervention:  [x]Individualized Meal Plan:3218-3213 calorie meal plan; 60-75g protein; 30-35g fiber []Understanding Lab Values   []Basic Pathophysiology of Disease []Food/Medication Interactions   [x]Food Diary: reviewed current food recall from past 3 days; recommended to continue food dairy daily for accountability [x]Exercise: Recommend 150 minutes of moderate intensity exercise per week (or 30 minutes of moderate intensity exercise x5 days per week); utilize [employer] for free workout videos instead of going to gym during Debora  ; plans to increase her time spent outside gardening, walking the dog, riding her bike, etc     [x]Lifestyle/Behavior Modification Techniques: Reviewed WFPB diet: Choose fiber-filled, nutrient-dense whole plant foods to fill half to 3/4 of your plate, including Vegetables and fruits, Legumes, whole grains, nuts/seeds; Limit/avoid sugary drinks/beverages, processed meats and snacks, red meats/poultry/eggs/high-fat dairy    Mindful eating techniques: slowing down rate of eating to 30 minutes per meal; waiting 20 minutes and drinking a glass of water before getting 2nd helping of food item; focus on protein source and vegetable when getting 2nd helping vs carbohydrate  []Medication, Mechanism of Action   [x]Label Reading: >3g fiber per serving; <350mg sodium per serving []Self Blood Glucose Monitoring   []Weight/BMI Goals: pt's goal weight is between 140-150#  No set timeframe to achieve this goal  Aiming for at least a 5# weight loss by next follow up in 6 weeks, goal to achieve is a 5% loss (~10#)    []Other -    Other Notes:        Comprehension: []Excellent  []Very Good  [x]Good  []Fair   []Poor    Receptivity: []Excellent  []Very Good  [x]Good  []Fair   []Poor    Expected Compliance: []Excellent  []Very Good  [x]Good  []Fair   []Poor      Labs:  CMP  Lab Results   Component Value Date    K 4 6 02/27/2021     02/27/2021    CO2 28 02/27/2021    BUN 17 02/27/2021    CREATININE 0 99 02/27/2021    GLUF 92 02/27/2021    CALCIUM 9 4 02/27/2021    AST 13 02/27/2021    ALT 21 02/27/2021    ALKPHOS 78 02/27/2021    EGFR 63 02/27/2021       BMP  Lab Results   Component Value Date    CALCIUM 9 4 02/27/2021    K 4 6 02/27/2021    CO2 28 02/27/2021     02/27/2021    BUN 17 02/27/2021    CREATININE 0 99 02/27/2021       Lipids  Lab Results   Component Value Date    CHOL 193 08/11/2015    CHOL 175 06/06/2014     Lab Results   Component Value Date    HDL 53 02/27/2021    HDL 54 12/31/2020    HDL 48 08/21/2019     Lab Results   Component Value Date    LDLCALC 116 (H) 02/27/2021    LDLCALC 164 (H) 12/31/2020    LDLCALC 120 (H) 08/21/2019     Lab Results   Component Value Date    TRIG 199 (H) 02/27/2021    TRIG 111 12/31/2020    TRIG 149 08/21/2019     No results found for: CHOLHDL    Hemoglobin A1C  Lab Results   Component Value Date    HGBA1C 5 3 12/31/2020       Fasting Glucose  Lab Results   Component Value Date    GLUF 92 02/27/2021       Insulin     Thyroid  No results found for: TSH, O7GJGEM, Y6SNXML, THYROIDAB    Hepatic Function Panel  Lab Results   Component Value Date    ALT 21 02/27/2021    AST 13 02/27/2021    ALKPHOS 78 02/27/2021       Celiac Disease Antibody Panel  No results found for: ENDOMYSIAL IGA, GLIADIN IGA, GLIADIN IGG, IGA, TISSUE TRANSGLUT AB, TTG IGA   Iron  No results found for: IRON, TIBC, FERRITIN    Vitamins  No results found for: VITAMIN B2   No results found for: NICOTINAMIDE, NICOTINIC ACID   No results found for: VITAMINB6  Lab Results   Component Value Date    SROGBOPS69 353 12/15/2017     No results found for: VITB5  No results found for: L9LMNPEL  No results found for: THYROGLB  No results found for: VITAMIN K   No results found for: 25-HYDROXY VIT D   No components found for: VITAMINE     No follow-ups on file  Shila Rhodes MS, RD, Trace, FARAZN  Clinical Nutrition   [email or web address]  [address]  [address] [address]

## 2021-05-11 ENCOUNTER — TELEPHONE (OUTPATIENT)
Dept: INTERNAL MEDICINE CLINIC | Facility: CLINIC | Age: 56
End: 2021-05-11

## 2021-05-11 NOTE — TELEPHONE ENCOUNTER
Patient states she is fully vaccinated but was around someone who tested positive for COVID  She has no symptoms but would like to know if She is safe to be around other people

## 2021-05-16 DIAGNOSIS — R10.9 ABDOMINAL CRAMPING: ICD-10-CM

## 2021-05-17 RX ORDER — DICYCLOMINE HCL 20 MG
20 TABLET ORAL EVERY 6 HOURS PRN
Qty: 90 TABLET | Refills: 2 | Status: SHIPPED | OUTPATIENT
Start: 2021-05-17 | End: 2022-06-29 | Stop reason: SDUPTHER

## 2021-05-24 ENCOUNTER — TELEPHONE (OUTPATIENT)
Dept: GASTROENTEROLOGY | Facility: CLINIC | Age: 56
End: 2021-05-24

## 2021-05-24 NOTE — TELEPHONE ENCOUNTER
----- Message from Kanu Moon sent at 5/22/2021  8:03 AM EDT -----  Regarding: Non-Urgent Medical Question  Contact: [phone]  Several days ago, I asked for a refill on my Bentyl  To date it says that no one has looked at it  It also falls under [facility], who I don't see  Would you be able to look into this so that my medication could be refilled? Thank you so much      Danielle Malave

## 2021-07-14 DIAGNOSIS — L68.0 HIRSUTISM: ICD-10-CM

## 2021-07-14 DIAGNOSIS — K21.9 GASTROESOPHAGEAL REFLUX DISEASE WITHOUT ESOPHAGITIS: ICD-10-CM

## 2021-07-14 RX ORDER — SPIRONOLACTONE 50 MG/1
50 TABLET, FILM COATED ORAL 2 TIMES DAILY
Qty: 180 TABLET | Refills: 2 | Status: SHIPPED | OUTPATIENT
Start: 2021-07-14 | End: 2022-04-10

## 2021-07-15 RX ORDER — FAMOTIDINE 40 MG/1
40 TABLET, FILM COATED ORAL 2 TIMES DAILY PRN
Qty: 180 TABLET | Refills: 63 | Status: SHIPPED | OUTPATIENT
Start: 2021-07-15

## 2021-08-23 ENCOUNTER — TELEPHONE (OUTPATIENT)
Dept: ENDOCRINOLOGY | Facility: CLINIC | Age: 56
End: 2021-08-23

## 2021-08-23 DIAGNOSIS — E66.9 LIFELONG OBESITY: ICD-10-CM

## 2021-08-23 RX ORDER — LIRAGLUTIDE 6 MG/ML
3 INJECTION, SOLUTION SUBCUTANEOUS DAILY
Qty: 15 ML | Refills: 0 | Status: CANCELLED | OUTPATIENT
Start: 2021-08-23

## 2021-08-23 NOTE — TELEPHONE ENCOUNTER
DIEGO ROBLES (Key: [id number])  Saxenda 18MG/3ML pen-injectors     Form  [employer] Prescription Drug Prior Authorization Form   Plan Contact  [phone] phone  [phone] fax

## 2021-08-27 DIAGNOSIS — E66.9 LIFELONG OBESITY: ICD-10-CM

## 2021-08-27 NOTE — TELEPHONE ENCOUNTER
Devante TAVAREZ approved, PA # [id number], Eff 8/25/2021 - 2/21/2022    Rx can only be filled at [facility]

## 2021-08-30 RX ORDER — LIRAGLUTIDE 6 MG/ML
3 INJECTION, SOLUTION SUBCUTANEOUS DAILY
Qty: 15 ML | Refills: 2 | Status: SHIPPED | OUTPATIENT
Start: 2021-08-30

## 2022-03-10 DIAGNOSIS — E83.52 HIGH CALCIUM LEVELS: ICD-10-CM

## 2022-03-10 DIAGNOSIS — E78.5 HYPERLIPIDEMIA, UNSPECIFIED HYPERLIPIDEMIA TYPE: ICD-10-CM

## 2022-03-10 DIAGNOSIS — E66.09 CLASS 1 OBESITY DUE TO EXCESS CALORIES WITH SERIOUS COMORBIDITY AND BODY MASS INDEX (BMI) OF 33.0 TO 33.9 IN ADULT: ICD-10-CM

## 2022-03-10 DIAGNOSIS — E55.9 VITAMIN D DEFICIENCY: ICD-10-CM

## 2022-03-10 DIAGNOSIS — R73.03 PREDIABETES: Primary | ICD-10-CM

## 2022-06-29 DIAGNOSIS — K58.2 IRRITABLE BOWEL SYNDROME WITH BOTH CONSTIPATION AND DIARRHEA: ICD-10-CM

## 2022-06-29 DIAGNOSIS — R10.9 ABDOMINAL CRAMPING: ICD-10-CM

## 2022-06-29 RX ORDER — AMITRIPTYLINE HYDROCHLORIDE 25 MG/1
25 TABLET, FILM COATED ORAL
Qty: 90 TABLET | Refills: 3 | Status: SHIPPED | OUTPATIENT
Start: 2022-06-29

## 2022-06-29 RX ORDER — AMITRIPTYLINE HYDROCHLORIDE 25 MG/1
25 TABLET, FILM COATED ORAL
Qty: 90 TABLET | Refills: 3 | Status: SHIPPED | OUTPATIENT
Start: 2022-06-29 | End: 2022-06-29

## 2022-06-29 RX ORDER — DICYCLOMINE HCL 20 MG
20 TABLET ORAL EVERY 6 HOURS PRN
Qty: 90 TABLET | Refills: 2 | Status: SHIPPED | OUTPATIENT
Start: 2022-06-29 | End: 2022-06-29

## 2022-06-29 RX ORDER — DICYCLOMINE HCL 20 MG
20 TABLET ORAL EVERY 6 HOURS PRN
Qty: 90 TABLET | Refills: 2 | Status: SHIPPED | OUTPATIENT
Start: 2022-06-29

## 2022-09-20 DIAGNOSIS — L68.0 HIRSUTISM: ICD-10-CM

## 2022-09-20 RX ORDER — SPIRONOLACTONE 50 MG/1
50 TABLET, FILM COATED ORAL 2 TIMES DAILY
Qty: 180 TABLET | Refills: 2 | Status: SHIPPED | OUTPATIENT
Start: 2022-09-20 | End: 2023-06-17

## 2022-09-27 DIAGNOSIS — K21.9 GASTROESOPHAGEAL REFLUX DISEASE WITHOUT ESOPHAGITIS: ICD-10-CM

## 2022-09-27 RX ORDER — FAMOTIDINE 40 MG/1
40 TABLET, FILM COATED ORAL 2 TIMES DAILY PRN
Qty: 180 TABLET | Refills: 0 | Status: SHIPPED | OUTPATIENT
Start: 2022-09-27

## 2023-04-27 ENCOUNTER — TELEPHONE (OUTPATIENT)
Dept: ADMINISTRATIVE | Facility: OTHER | Age: 58
End: 2023-04-27

## 2023-04-27 NOTE — TELEPHONE ENCOUNTER
04/27/23 11:20 AM    Patient contacted ({ [id number]}) to bring ACP document to next scheduled pcp visit  Thank you    Jenn Reese [facility]

## 2023-04-27 NOTE — TELEPHONE ENCOUNTER
----- Message from Gilford Loft sent at 4/26/2023  3:47 PM EDT -----  Regarding: Mammogram  04/26/23 3:47 PM    Hello, our patient Montrell Romo has had Mammogram completed/performed  Please assist in updating the patient chart by pulling a previous Electronic Medical Record (EMR) document  The previous EMR is [facility]  The date of service is 04/13/2023      Thank you,  Gilford Loft  [facility]

## 2023-04-28 NOTE — PROGRESS NOTES
Upon review of the In Basket request we were able to locate, review, and update the patient chart as requested for Mammogram     Any additional questions or concerns should be emailed to the Practice Liaisons via the appropriate education email address, please do not reply via In Basket      Thank you  Isra Byers

## 2024-06-21 DIAGNOSIS — Z00.6 ENCOUNTER FOR EXAMINATION FOR NORMAL COMPARISON OR CONTROL IN CLINICAL RESEARCH PROGRAM: ICD-10-CM

## 2024-06-24 ENCOUNTER — APPOINTMENT (OUTPATIENT)
Dept: LAB | Facility: HOSPITAL | Age: 59
End: 2024-06-24

## 2024-06-24 DIAGNOSIS — Z00.6 ENCOUNTER FOR EXAMINATION FOR NORMAL COMPARISON OR CONTROL IN CLINICAL RESEARCH PROGRAM: ICD-10-CM

## 2024-06-24 PROCEDURE — 36415 COLL VENOUS BLD VENIPUNCTURE: CPT

## 2024-07-10 LAB
APOB+LDLR+PCSK9 GENE MUT ANL BLD/T: NOT DETECTED
BRCA1+BRCA2 DEL+DUP + FULL MUT ANL BLD/T: NOT DETECTED
MLH1+MSH2+MSH6+PMS2 GN DEL+DUP+FUL M: NOT DETECTED

## 2024-10-29 ENCOUNTER — TELEPHONE (OUTPATIENT)
Dept: GASTROENTEROLOGY | Facility: CLINIC | Age: 59
End: 2024-10-29